# Patient Record
Sex: MALE | Race: OTHER | NOT HISPANIC OR LATINO | URBAN - METROPOLITAN AREA
[De-identification: names, ages, dates, MRNs, and addresses within clinical notes are randomized per-mention and may not be internally consistent; named-entity substitution may affect disease eponyms.]

---

## 2017-02-04 ENCOUNTER — EMERGENCY (EMERGENCY)
Facility: HOSPITAL | Age: 57
LOS: 1 days | Discharge: HOME | End: 2017-02-04
Attending: EMERGENCY MEDICINE | Admitting: EMERGENCY MEDICINE
Payer: MEDICAID

## 2017-02-04 VITALS
DIASTOLIC BLOOD PRESSURE: 88 MMHG | OXYGEN SATURATION: 100 % | TEMPERATURE: 98 F | SYSTOLIC BLOOD PRESSURE: 125 MMHG | HEART RATE: 95 BPM | RESPIRATION RATE: 17 BRPM

## 2017-02-04 VITALS
DIASTOLIC BLOOD PRESSURE: 89 MMHG | OXYGEN SATURATION: 100 % | TEMPERATURE: 98 F | HEART RATE: 76 BPM | RESPIRATION RATE: 19 BRPM | SYSTOLIC BLOOD PRESSURE: 158 MMHG

## 2017-02-04 LAB
ALBUMIN SERPL ELPH-MCNC: 4.1 G/DL — SIGNIFICANT CHANGE UP (ref 3.3–5)
ALP SERPL-CCNC: 68 U/L — SIGNIFICANT CHANGE UP (ref 40–120)
ALT FLD-CCNC: 20 U/L — SIGNIFICANT CHANGE UP (ref 4–41)
AST SERPL-CCNC: 25 U/L — SIGNIFICANT CHANGE UP (ref 4–40)
BASOPHILS # BLD AUTO: 0.04 K/UL — SIGNIFICANT CHANGE UP (ref 0–0.2)
BASOPHILS NFR BLD AUTO: 0.4 % — SIGNIFICANT CHANGE UP (ref 0–2)
BILIRUB SERPL-MCNC: 0.2 MG/DL — SIGNIFICANT CHANGE UP (ref 0.2–1.2)
BUN SERPL-MCNC: 9 MG/DL — SIGNIFICANT CHANGE UP (ref 7–23)
CALCIUM SERPL-MCNC: 8.6 MG/DL — SIGNIFICANT CHANGE UP (ref 8.4–10.5)
CHLORIDE SERPL-SCNC: 98 MMOL/L — SIGNIFICANT CHANGE UP (ref 98–107)
CK MB BLD-MCNC: 1.76 NG/ML — SIGNIFICANT CHANGE UP (ref 1–6.6)
CK MB BLD-MCNC: SIGNIFICANT CHANGE UP (ref 0–2.5)
CK SERPL-CCNC: 84 U/L — SIGNIFICANT CHANGE UP (ref 30–200)
CK SERPL-CCNC: 98 U/L — SIGNIFICANT CHANGE UP (ref 30–200)
CO2 SERPL-SCNC: 25 MMOL/L — SIGNIFICANT CHANGE UP (ref 22–31)
CREAT SERPL-MCNC: 0.66 MG/DL — SIGNIFICANT CHANGE UP (ref 0.5–1.3)
CRP SERPL-MCNC: 1.9 MG/L — SIGNIFICANT CHANGE UP (ref 0.3–5)
EOSINOPHIL # BLD AUTO: 0.09 K/UL — SIGNIFICANT CHANGE UP (ref 0–0.5)
EOSINOPHIL NFR BLD AUTO: 1 % — SIGNIFICANT CHANGE UP (ref 0–6)
ERYTHROCYTE [SEDIMENTATION RATE] IN BLOOD: 7 MM/HR — SIGNIFICANT CHANGE UP (ref 1–15)
GLUCOSE SERPL-MCNC: 118 MG/DL — HIGH (ref 70–99)
HCT VFR BLD CALC: 43.2 % — SIGNIFICANT CHANGE UP (ref 39–50)
HGB BLD-MCNC: 14.7 G/DL — SIGNIFICANT CHANGE UP (ref 13–17)
IMM GRANULOCYTES NFR BLD AUTO: 0.2 % — SIGNIFICANT CHANGE UP (ref 0–1.5)
LYMPHOCYTES # BLD AUTO: 3.76 K/UL — HIGH (ref 1–3.3)
LYMPHOCYTES # BLD AUTO: 41.4 % — SIGNIFICANT CHANGE UP (ref 13–44)
MCHC RBC-ENTMCNC: 28.5 PG — SIGNIFICANT CHANGE UP (ref 27–34)
MCHC RBC-ENTMCNC: 34 % — SIGNIFICANT CHANGE UP (ref 32–36)
MCV RBC AUTO: 83.7 FL — SIGNIFICANT CHANGE UP (ref 80–100)
MONOCYTES # BLD AUTO: 0.67 K/UL — SIGNIFICANT CHANGE UP (ref 0–0.9)
MONOCYTES NFR BLD AUTO: 7.4 % — SIGNIFICANT CHANGE UP (ref 2–14)
NEUTROPHILS # BLD AUTO: 4.51 K/UL — SIGNIFICANT CHANGE UP (ref 1.8–7.4)
NEUTROPHILS NFR BLD AUTO: 49.6 % — SIGNIFICANT CHANGE UP (ref 43–77)
PLATELET # BLD AUTO: 240 K/UL — SIGNIFICANT CHANGE UP (ref 150–400)
PMV BLD: 10.2 FL — SIGNIFICANT CHANGE UP (ref 7–13)
POTASSIUM SERPL-MCNC: 3.9 MMOL/L — SIGNIFICANT CHANGE UP (ref 3.5–5.3)
POTASSIUM SERPL-SCNC: 3.9 MMOL/L — SIGNIFICANT CHANGE UP (ref 3.5–5.3)
PROT SERPL-MCNC: 7.3 G/DL — SIGNIFICANT CHANGE UP (ref 6–8.3)
RBC # BLD: 5.16 M/UL — SIGNIFICANT CHANGE UP (ref 4.2–5.8)
RBC # FLD: 13.1 % — SIGNIFICANT CHANGE UP (ref 10.3–14.5)
SODIUM SERPL-SCNC: 138 MMOL/L — SIGNIFICANT CHANGE UP (ref 135–145)
TROPONIN T SERPL-MCNC: < 0.06 NG/ML — SIGNIFICANT CHANGE UP (ref 0–0.06)
TROPONIN T SERPL-MCNC: < 0.06 NG/ML — SIGNIFICANT CHANGE UP (ref 0–0.06)
WBC # BLD: 9.09 K/UL — SIGNIFICANT CHANGE UP (ref 3.8–10.5)
WBC # FLD AUTO: 9.09 K/UL — SIGNIFICANT CHANGE UP (ref 3.8–10.5)

## 2017-02-04 PROCEDURE — 93010 ELECTROCARDIOGRAM REPORT: CPT

## 2017-02-04 PROCEDURE — 99285 EMERGENCY DEPT VISIT HI MDM: CPT | Mod: 25

## 2017-02-04 PROCEDURE — 71020: CPT | Mod: 26

## 2017-02-04 RX ORDER — KETOROLAC TROMETHAMINE 30 MG/ML
15 SYRINGE (ML) INJECTION ONCE
Qty: 0 | Refills: 0 | Status: DISCONTINUED | OUTPATIENT
Start: 2017-02-04 | End: 2017-02-04

## 2017-02-04 RX ADMIN — Medication 15 MILLIGRAM(S): at 16:40

## 2017-02-04 RX ADMIN — Medication 15 MILLIGRAM(S): at 16:52

## 2017-02-04 NOTE — ED ADULT NURSE NOTE - OBJECTIVE STATEMENT
Pt a&ox3 primarily Sully speaking, pt refuses translation services. Pt c/o Rt sided facial pain, b/l shoulder pain (worst upon movement), since last night and intermittent cp to right side of his chest, reproducible. Pt breathing even and unlabored resp, denies headache/dizziness. Pt NSR on monitor. Abdomen soft, non-tender, non-distended. Skin is cool dry and intact. MD at [facility]. Will continue to monitor.

## 2017-02-04 NOTE — ED ADULT TRIAGE NOTE - CHIEF COMPLAINT QUOTE
Co right sided facial droop and numbness since 8:30pm last night. Hx of bells palsy. Co numbness to right side of facial, droop resolved. Equal strength noted in all extremities

## 2017-02-04 NOTE — ED PROVIDER NOTE - OBJECTIVE STATEMENT
I, ita alicea, am fluient in [facility]  56M pmh bell's palsy, htn, hld, gerd p/w R facial numbness acute on chronic. pt has had R sided facial numbness 4 times in life, last episode in november 2016 prior to that in 2013. this episode started yesterday. pt also has chronic intermitent b/l shoulder & trapezius pain sometime radiating to R pectoral region, worsened by exertion, relieved by advil. denies any current cp, focal areas of weakness, sob, f/c, nv/d, tick bites, recent travel, ha, visual changes, tinnitus. pt has had mri & ct head in [facility] that were unremarkable.

## 2017-02-04 NOTE — ED PROVIDER NOTE - ATTENDING CONTRIBUTION TO CARE
Locurto  pt with c/o upper back pain since last night also some assoc rt CP rt facial pain  Pain appears worse with upper body movement  denies SOB fever  weakness/numbness in arms or legs  no recent change in medication  Exam    pt in NAD  nl strength and sensation b/l,  CN nl, abd benign  lungs clear  no LE edema    pain appears worse with upper body movement carmen lifting arms over head  nl pulses in carotids  EKG  nl   check CXR electrolytes  CPK (on statin)   ERR/CRP (shoulder girdle pain)

## 2017-02-04 NOTE — ED PROVIDER NOTE - MEDICAL DECISION MAKING DETAILS
ddx: myalgia possibly 2/2 statins vs polymyalgia rheumatica vs muscle strain vs sprain. bell's palsy unlikely given no acute facial weakness. will r/o acs.   -ce x2 -cxr  -labs -esr -crp -reassess -consider dc home w/ rheum f/u

## 2022-01-16 ENCOUNTER — INPATIENT (INPATIENT)
Facility: HOSPITAL | Age: 62
LOS: 2 days | Discharge: HOME | End: 2022-01-19
Attending: STUDENT IN AN ORGANIZED HEALTH CARE EDUCATION/TRAINING PROGRAM | Admitting: STUDENT IN AN ORGANIZED HEALTH CARE EDUCATION/TRAINING PROGRAM
Payer: MEDICAID

## 2022-01-16 VITALS
RESPIRATION RATE: 16 BRPM | TEMPERATURE: 100 F | SYSTOLIC BLOOD PRESSURE: 155 MMHG | DIASTOLIC BLOOD PRESSURE: 83 MMHG | HEART RATE: 94 BPM | OXYGEN SATURATION: 98 %

## 2022-01-16 DIAGNOSIS — E78.5 HYPERLIPIDEMIA, UNSPECIFIED: ICD-10-CM

## 2022-01-16 DIAGNOSIS — R20.0 ANESTHESIA OF SKIN: ICD-10-CM

## 2022-01-16 DIAGNOSIS — K21.9 GASTRO-ESOPHAGEAL REFLUX DISEASE WITHOUT ESOPHAGITIS: ICD-10-CM

## 2022-01-16 DIAGNOSIS — I10 ESSENTIAL (PRIMARY) HYPERTENSION: ICD-10-CM

## 2022-01-16 DIAGNOSIS — R22.0 LOCALIZED SWELLING, MASS AND LUMP, HEAD: ICD-10-CM

## 2022-01-16 DIAGNOSIS — U07.1 COVID-19: ICD-10-CM

## 2022-01-16 DIAGNOSIS — E11.9 TYPE 2 DIABETES MELLITUS WITHOUT COMPLICATIONS: ICD-10-CM

## 2022-01-16 DIAGNOSIS — R59.0 LOCALIZED ENLARGED LYMPH NODES: ICD-10-CM

## 2022-01-16 DIAGNOSIS — R59.1 GENERALIZED ENLARGED LYMPH NODES: ICD-10-CM

## 2022-01-16 LAB
ALBUMIN SERPL ELPH-MCNC: 4.1 G/DL — SIGNIFICANT CHANGE UP (ref 3.3–5)
ALP SERPL-CCNC: 85 U/L — SIGNIFICANT CHANGE UP (ref 40–120)
ALT FLD-CCNC: 28 U/L — SIGNIFICANT CHANGE UP (ref 4–41)
ANION GAP SERPL CALC-SCNC: 13 MMOL/L — SIGNIFICANT CHANGE UP (ref 7–14)
ANION GAP SERPL CALC-SCNC: 9 MMOL/L — SIGNIFICANT CHANGE UP (ref 7–14)
APPEARANCE UR: CLEAR — SIGNIFICANT CHANGE UP
APTT BLD: 26.6 SEC — LOW (ref 27–36.3)
AST SERPL-CCNC: 19 U/L — SIGNIFICANT CHANGE UP (ref 4–40)
BASE EXCESS BLDV CALC-SCNC: 3.9 MMOL/L — HIGH (ref -2–3)
BASOPHILS # BLD AUTO: 0.04 K/UL — SIGNIFICANT CHANGE UP (ref 0–0.2)
BASOPHILS NFR BLD AUTO: 0.3 % — SIGNIFICANT CHANGE UP (ref 0–2)
BILIRUB SERPL-MCNC: 0.5 MG/DL — SIGNIFICANT CHANGE UP (ref 0.2–1.2)
BILIRUB UR-MCNC: NEGATIVE — SIGNIFICANT CHANGE UP
BLOOD GAS VENOUS COMPREHENSIVE RESULT: SIGNIFICANT CHANGE UP
BUN SERPL-MCNC: 6 MG/DL — LOW (ref 7–23)
BUN SERPL-MCNC: 6 MG/DL — LOW (ref 7–23)
C4 SERPL-MCNC: 40 MG/DL — SIGNIFICANT CHANGE UP (ref 10–40)
CA-I SERPL-SCNC: 1.1 MMOL/L — LOW (ref 1.15–1.33)
CALCIUM SERPL-MCNC: 8.2 MG/DL — LOW (ref 8.4–10.5)
CALCIUM SERPL-MCNC: 8.5 MG/DL — SIGNIFICANT CHANGE UP (ref 8.4–10.5)
CHLORIDE BLDV-SCNC: 99 MMOL/L — SIGNIFICANT CHANGE UP (ref 96–108)
CHLORIDE SERPL-SCNC: 88 MMOL/L — LOW (ref 98–107)
CHLORIDE SERPL-SCNC: 97 MMOL/L — LOW (ref 98–107)
CO2 BLDV-SCNC: 28.9 MMOL/L — HIGH (ref 22–26)
CO2 SERPL-SCNC: 28 MMOL/L — SIGNIFICANT CHANGE UP (ref 22–31)
CO2 SERPL-SCNC: 29 MMOL/L — SIGNIFICANT CHANGE UP (ref 22–31)
COLOR SPEC: COLORLESS — SIGNIFICANT CHANGE UP
CREAT SERPL-MCNC: 0.65 MG/DL — SIGNIFICANT CHANGE UP (ref 0.5–1.3)
CREAT SERPL-MCNC: 0.7 MG/DL — SIGNIFICANT CHANGE UP (ref 0.5–1.3)
CRP SERPL-MCNC: 9.3 MG/L — HIGH
D DIMER BLD IA.RAPID-MCNC: 770 NG/ML DDU — HIGH
DIFF PNL FLD: NEGATIVE — SIGNIFICANT CHANGE UP
EOSINOPHIL # BLD AUTO: 0.04 K/UL — SIGNIFICANT CHANGE UP (ref 0–0.5)
EOSINOPHIL NFR BLD AUTO: 0.3 % — SIGNIFICANT CHANGE UP (ref 0–6)
ERYTHROCYTE [SEDIMENTATION RATE] IN BLOOD: 9 MM/HR — SIGNIFICANT CHANGE UP (ref 1–15)
FERRITIN SERPL-MCNC: 112 NG/ML — SIGNIFICANT CHANGE UP (ref 30–400)
FIBRINOGEN PPP-MCNC: 322 MG/DL — SIGNIFICANT CHANGE UP (ref 290–520)
FLUAV AG NPH QL: SIGNIFICANT CHANGE UP
FLUBV AG NPH QL: SIGNIFICANT CHANGE UP
GAS PNL BLDV: 134 MMOL/L — LOW (ref 136–145)
GAS PNL BLDV: SIGNIFICANT CHANGE UP
GLUCOSE BLDV-MCNC: 211 MG/DL — HIGH (ref 70–99)
GLUCOSE SERPL-MCNC: 121 MG/DL — HIGH (ref 70–99)
GLUCOSE SERPL-MCNC: 228 MG/DL — HIGH (ref 70–99)
GLUCOSE UR QL: NEGATIVE — SIGNIFICANT CHANGE UP
HCO3 BLDV-SCNC: 28 MMOL/L — SIGNIFICANT CHANGE UP (ref 22–29)
HCT VFR BLD CALC: 43.4 % — SIGNIFICANT CHANGE UP (ref 39–50)
HCT VFR BLDA CALC: 43 % — SIGNIFICANT CHANGE UP (ref 39–51)
HGB BLD CALC-MCNC: 14.2 G/DL — SIGNIFICANT CHANGE UP (ref 13–17)
HGB BLD-MCNC: 14.9 G/DL — SIGNIFICANT CHANGE UP (ref 13–17)
IANC: 9.41 K/UL — HIGH (ref 1.5–8.5)
IMM GRANULOCYTES NFR BLD AUTO: 0.4 % — SIGNIFICANT CHANGE UP (ref 0–1.5)
INR BLD: 1.03 RATIO — SIGNIFICANT CHANGE UP (ref 0.88–1.16)
KETONES UR-MCNC: NEGATIVE — SIGNIFICANT CHANGE UP
LACTATE BLDV-MCNC: 2 MMOL/L — SIGNIFICANT CHANGE UP (ref 0.5–2)
LDH SERPL L TO P-CCNC: 373 U/L — HIGH (ref 135–225)
LEUKOCYTE ESTERASE UR-ACNC: NEGATIVE — SIGNIFICANT CHANGE UP
LYMPHOCYTES # BLD AUTO: 2.64 K/UL — SIGNIFICANT CHANGE UP (ref 1–3.3)
LYMPHOCYTES # BLD AUTO: 20.2 % — SIGNIFICANT CHANGE UP (ref 13–44)
MCHC RBC-ENTMCNC: 27.9 PG — SIGNIFICANT CHANGE UP (ref 27–34)
MCHC RBC-ENTMCNC: 34.3 GM/DL — SIGNIFICANT CHANGE UP (ref 32–36)
MCV RBC AUTO: 81.1 FL — SIGNIFICANT CHANGE UP (ref 80–100)
MONOCYTES # BLD AUTO: 0.9 K/UL — SIGNIFICANT CHANGE UP (ref 0–0.9)
MONOCYTES NFR BLD AUTO: 6.9 % — SIGNIFICANT CHANGE UP (ref 2–14)
NEUTROPHILS # BLD AUTO: 9.41 K/UL — HIGH (ref 1.8–7.4)
NEUTROPHILS NFR BLD AUTO: 71.9 % — SIGNIFICANT CHANGE UP (ref 43–77)
NITRITE UR-MCNC: NEGATIVE — SIGNIFICANT CHANGE UP
NRBC # BLD: 0 /100 WBCS — SIGNIFICANT CHANGE UP
NRBC # FLD: 0 K/UL — SIGNIFICANT CHANGE UP
PCO2 BLDV: 38 MMHG — LOW (ref 42–55)
PH BLDV: 7.47 — HIGH (ref 7.32–7.43)
PH UR: 7 — SIGNIFICANT CHANGE UP (ref 5–8)
PLATELET # BLD AUTO: 251 K/UL — SIGNIFICANT CHANGE UP (ref 150–400)
PO2 BLDV: 90 MMHG — SIGNIFICANT CHANGE UP
POTASSIUM BLDV-SCNC: 3.7 MMOL/L — SIGNIFICANT CHANGE UP (ref 3.5–5.1)
POTASSIUM SERPL-MCNC: 2.8 MMOL/L — CRITICAL LOW (ref 3.5–5.3)
POTASSIUM SERPL-MCNC: 3.8 MMOL/L — SIGNIFICANT CHANGE UP (ref 3.5–5.3)
POTASSIUM SERPL-SCNC: 2.8 MMOL/L — CRITICAL LOW (ref 3.5–5.3)
POTASSIUM SERPL-SCNC: 3.8 MMOL/L — SIGNIFICANT CHANGE UP (ref 3.5–5.3)
PROT SERPL-MCNC: 7.2 G/DL — SIGNIFICANT CHANGE UP (ref 6–8.3)
PROT UR-MCNC: NEGATIVE — SIGNIFICANT CHANGE UP
PROTHROM AB SERPL-ACNC: 11.7 SEC — SIGNIFICANT CHANGE UP (ref 10.6–13.6)
RBC # BLD: 5.35 M/UL — SIGNIFICANT CHANGE UP (ref 4.2–5.8)
RBC # FLD: 12.3 % — SIGNIFICANT CHANGE UP (ref 10.3–14.5)
RSV RNA NPH QL NAA+NON-PROBE: SIGNIFICANT CHANGE UP
SAO2 % BLDV: 97.2 % — SIGNIFICANT CHANGE UP
SARS-COV-2 RNA SPEC QL NAA+PROBE: DETECTED
SODIUM SERPL-SCNC: 129 MMOL/L — LOW (ref 135–145)
SODIUM SERPL-SCNC: 135 MMOL/L — SIGNIFICANT CHANGE UP (ref 135–145)
SP GR SPEC: 1 — SIGNIFICANT CHANGE UP (ref 1–1.05)
TROPONIN T, HIGH SENSITIVITY RESULT: 9 NG/L — SIGNIFICANT CHANGE UP
TROPONIN T, HIGH SENSITIVITY RESULT: 9 NG/L — SIGNIFICANT CHANGE UP
URATE SERPL-MCNC: 5.3 MG/DL — SIGNIFICANT CHANGE UP (ref 3.4–8.8)
UROBILINOGEN FLD QL: SIGNIFICANT CHANGE UP
WBC # BLD: 13.08 K/UL — HIGH (ref 3.8–10.5)
WBC # FLD AUTO: 13.08 K/UL — HIGH (ref 3.8–10.5)

## 2022-01-16 PROCEDURE — 70487 CT MAXILLOFACIAL W/DYE: CPT | Mod: 26,MA

## 2022-01-16 PROCEDURE — 93010 ELECTROCARDIOGRAM REPORT: CPT

## 2022-01-16 PROCEDURE — 99285 EMERGENCY DEPT VISIT HI MDM: CPT | Mod: 25

## 2022-01-16 PROCEDURE — 71045 X-RAY EXAM CHEST 1 VIEW: CPT | Mod: 26

## 2022-01-16 PROCEDURE — 70496 CT ANGIOGRAPHY HEAD: CPT | Mod: 26,MA

## 2022-01-16 PROCEDURE — 99223 1ST HOSP IP/OBS HIGH 75: CPT | Mod: GC

## 2022-01-16 PROCEDURE — 70498 CT ANGIOGRAPHY NECK: CPT | Mod: 26,MA

## 2022-01-16 RX ORDER — HYDROCHLOROTHIAZIDE 25 MG
25 TABLET ORAL DAILY
Refills: 0 | Status: DISCONTINUED | OUTPATIENT
Start: 2022-01-16 | End: 2022-01-19

## 2022-01-16 RX ORDER — INSULIN LISPRO 100/ML
1 VIAL (ML) SUBCUTANEOUS ONCE
Refills: 0 | Status: COMPLETED | OUTPATIENT
Start: 2022-01-16 | End: 2022-01-16

## 2022-01-16 RX ORDER — ENOXAPARIN SODIUM 100 MG/ML
40 INJECTION SUBCUTANEOUS DAILY
Refills: 0 | Status: DISCONTINUED | OUTPATIENT
Start: 2022-01-16 | End: 2022-01-16

## 2022-01-16 RX ORDER — ATORVASTATIN CALCIUM 80 MG/1
40 TABLET, FILM COATED ORAL AT BEDTIME
Refills: 0 | Status: DISCONTINUED | OUTPATIENT
Start: 2022-01-16 | End: 2022-01-19

## 2022-01-16 RX ORDER — METFORMIN HYDROCHLORIDE 850 MG/1
1 TABLET ORAL
Qty: 0 | Refills: 0 | DISCHARGE

## 2022-01-16 RX ORDER — DEXTROSE 50 % IN WATER 50 %
12.5 SYRINGE (ML) INTRAVENOUS ONCE
Refills: 0 | Status: DISCONTINUED | OUTPATIENT
Start: 2022-01-16 | End: 2022-01-19

## 2022-01-16 RX ORDER — AMLODIPINE BESYLATE 2.5 MG/1
10 TABLET ORAL DAILY
Refills: 0 | Status: DISCONTINUED | OUTPATIENT
Start: 2022-01-16 | End: 2022-01-19

## 2022-01-16 RX ORDER — INSULIN LISPRO 100/ML
VIAL (ML) SUBCUTANEOUS AT BEDTIME
Refills: 0 | Status: DISCONTINUED | OUTPATIENT
Start: 2022-01-16 | End: 2022-01-19

## 2022-01-16 RX ORDER — MAGNESIUM SULFATE 500 MG/ML
2 VIAL (ML) INJECTION ONCE
Refills: 0 | Status: COMPLETED | OUTPATIENT
Start: 2022-01-16 | End: 2022-01-16

## 2022-01-16 RX ORDER — ENOXAPARIN SODIUM 100 MG/ML
80 INJECTION SUBCUTANEOUS
Refills: 0 | Status: DISCONTINUED | OUTPATIENT
Start: 2022-01-16 | End: 2022-01-16

## 2022-01-16 RX ORDER — LANOLIN ALCOHOL/MO/W.PET/CERES
3 CREAM (GRAM) TOPICAL AT BEDTIME
Refills: 0 | Status: DISCONTINUED | OUTPATIENT
Start: 2022-01-16 | End: 2022-01-19

## 2022-01-16 RX ORDER — GLUCAGON INJECTION, SOLUTION 0.5 MG/.1ML
1 INJECTION, SOLUTION SUBCUTANEOUS ONCE
Refills: 0 | Status: DISCONTINUED | OUTPATIENT
Start: 2022-01-16 | End: 2022-01-19

## 2022-01-16 RX ORDER — POTASSIUM CHLORIDE 20 MEQ
10 PACKET (EA) ORAL
Refills: 0 | Status: COMPLETED | OUTPATIENT
Start: 2022-01-16 | End: 2022-01-16

## 2022-01-16 RX ORDER — SODIUM CHLORIDE 9 MG/ML
1000 INJECTION INTRAMUSCULAR; INTRAVENOUS; SUBCUTANEOUS
Refills: 0 | Status: DISCONTINUED | OUTPATIENT
Start: 2022-01-16 | End: 2022-01-16

## 2022-01-16 RX ORDER — ENOXAPARIN SODIUM 100 MG/ML
40 INJECTION SUBCUTANEOUS ONCE
Refills: 0 | Status: COMPLETED | OUTPATIENT
Start: 2022-01-16 | End: 2022-01-16

## 2022-01-16 RX ORDER — ASPIRIN/CALCIUM CARB/MAGNESIUM 324 MG
81 TABLET ORAL DAILY
Refills: 0 | Status: DISCONTINUED | OUTPATIENT
Start: 2022-01-16 | End: 2022-01-19

## 2022-01-16 RX ORDER — POTASSIUM CHLORIDE 20 MEQ
40 PACKET (EA) ORAL ONCE
Refills: 0 | Status: COMPLETED | OUTPATIENT
Start: 2022-01-16 | End: 2022-01-16

## 2022-01-16 RX ORDER — ENOXAPARIN SODIUM 100 MG/ML
80 INJECTION SUBCUTANEOUS
Refills: 0 | Status: DISCONTINUED | OUTPATIENT
Start: 2022-01-17 | End: 2022-01-19

## 2022-01-16 RX ORDER — DEXTROSE 50 % IN WATER 50 %
25 SYRINGE (ML) INTRAVENOUS ONCE
Refills: 0 | Status: DISCONTINUED | OUTPATIENT
Start: 2022-01-16 | End: 2022-01-19

## 2022-01-16 RX ORDER — ATORVASTATIN CALCIUM 80 MG/1
1 TABLET, FILM COATED ORAL
Qty: 0 | Refills: 0 | DISCHARGE

## 2022-01-16 RX ORDER — DEXAMETHASONE 0.5 MG/5ML
6 ELIXIR ORAL ONCE
Refills: 0 | Status: COMPLETED | OUTPATIENT
Start: 2022-01-16 | End: 2022-01-16

## 2022-01-16 RX ORDER — SODIUM CHLORIDE 9 MG/ML
1000 INJECTION INTRAMUSCULAR; INTRAVENOUS; SUBCUTANEOUS
Refills: 0 | Status: DISCONTINUED | OUTPATIENT
Start: 2022-01-16 | End: 2022-01-19

## 2022-01-16 RX ORDER — INSULIN LISPRO 100/ML
VIAL (ML) SUBCUTANEOUS
Refills: 0 | Status: DISCONTINUED | OUTPATIENT
Start: 2022-01-16 | End: 2022-01-19

## 2022-01-16 RX ORDER — DEXTROSE 50 % IN WATER 50 %
15 SYRINGE (ML) INTRAVENOUS ONCE
Refills: 0 | Status: DISCONTINUED | OUTPATIENT
Start: 2022-01-16 | End: 2022-01-19

## 2022-01-16 RX ORDER — FAMOTIDINE 10 MG/ML
20 INJECTION INTRAVENOUS ONCE
Refills: 0 | Status: COMPLETED | OUTPATIENT
Start: 2022-01-16 | End: 2022-01-16

## 2022-01-16 RX ORDER — SODIUM CHLORIDE 9 MG/ML
1000 INJECTION, SOLUTION INTRAVENOUS
Refills: 0 | Status: DISCONTINUED | OUTPATIENT
Start: 2022-01-16 | End: 2022-01-19

## 2022-01-16 RX ORDER — ACETAMINOPHEN 500 MG
975 TABLET ORAL ONCE
Refills: 0 | Status: COMPLETED | OUTPATIENT
Start: 2022-01-16 | End: 2022-01-16

## 2022-01-16 RX ORDER — FAMOTIDINE 10 MG/ML
0 INJECTION INTRAVENOUS
Qty: 0 | Refills: 0 | DISCHARGE

## 2022-01-16 RX ORDER — AMLODIPINE BESYLATE 2.5 MG/1
1 TABLET ORAL
Qty: 0 | Refills: 0 | DISCHARGE

## 2022-01-16 RX ORDER — DIPHENHYDRAMINE HCL 50 MG
25 CAPSULE ORAL ONCE
Refills: 0 | Status: COMPLETED | OUTPATIENT
Start: 2022-01-16 | End: 2022-01-16

## 2022-01-16 RX ORDER — SODIUM CHLORIDE 9 MG/ML
1000 INJECTION INTRAMUSCULAR; INTRAVENOUS; SUBCUTANEOUS ONCE
Refills: 0 | Status: COMPLETED | OUTPATIENT
Start: 2022-01-16 | End: 2022-01-16

## 2022-01-16 RX ORDER — FAMOTIDINE 10 MG/ML
20 INJECTION INTRAVENOUS DAILY
Refills: 0 | Status: DISCONTINUED | OUTPATIENT
Start: 2022-01-16 | End: 2022-01-19

## 2022-01-16 RX ORDER — SODIUM CHLORIDE 9 MG/ML
1000 INJECTION, SOLUTION INTRAVENOUS ONCE
Refills: 0 | Status: COMPLETED | OUTPATIENT
Start: 2022-01-16 | End: 2022-01-16

## 2022-01-16 RX ORDER — ACETAMINOPHEN 500 MG
650 TABLET ORAL EVERY 6 HOURS
Refills: 0 | Status: DISCONTINUED | OUTPATIENT
Start: 2022-01-16 | End: 2022-01-19

## 2022-01-16 RX ADMIN — ENOXAPARIN SODIUM 40 MILLIGRAM(S): 100 INJECTION SUBCUTANEOUS at 18:34

## 2022-01-16 RX ADMIN — Medication 25 MILLIGRAM(S): at 13:21

## 2022-01-16 RX ADMIN — Medication 1 UNIT(S): at 13:21

## 2022-01-16 RX ADMIN — Medication 975 MILLIGRAM(S): at 03:09

## 2022-01-16 RX ADMIN — Medication 100 MILLIEQUIVALENT(S): at 03:17

## 2022-01-16 RX ADMIN — SODIUM CHLORIDE 1000 MILLILITER(S): 9 INJECTION INTRAMUSCULAR; INTRAVENOUS; SUBCUTANEOUS at 02:13

## 2022-01-16 RX ADMIN — FAMOTIDINE 20 MILLIGRAM(S): 10 INJECTION INTRAVENOUS at 13:21

## 2022-01-16 RX ADMIN — Medication 25 MILLIGRAM(S): at 02:12

## 2022-01-16 RX ADMIN — Medication 2: at 17:45

## 2022-01-16 RX ADMIN — Medication 1 TABLET(S): at 15:31

## 2022-01-16 RX ADMIN — Medication 6 MILLIGRAM(S): at 02:13

## 2022-01-16 RX ADMIN — SODIUM CHLORIDE 1000 MILLILITER(S): 9 INJECTION, SOLUTION INTRAVENOUS at 07:01

## 2022-01-16 RX ADMIN — SODIUM CHLORIDE 125 MILLILITER(S): 9 INJECTION INTRAMUSCULAR; INTRAVENOUS; SUBCUTANEOUS at 03:17

## 2022-01-16 RX ADMIN — FAMOTIDINE 20 MILLIGRAM(S): 10 INJECTION INTRAVENOUS at 02:12

## 2022-01-16 RX ADMIN — Medication 81 MILLIGRAM(S): at 13:22

## 2022-01-16 RX ADMIN — SODIUM CHLORIDE 125 MILLILITER(S): 9 INJECTION INTRAMUSCULAR; INTRAVENOUS; SUBCUTANEOUS at 15:30

## 2022-01-16 RX ADMIN — ATORVASTATIN CALCIUM 40 MILLIGRAM(S): 80 TABLET, FILM COATED ORAL at 21:08

## 2022-01-16 RX ADMIN — ENOXAPARIN SODIUM 40 MILLIGRAM(S): 100 INJECTION SUBCUTANEOUS at 13:22

## 2022-01-16 RX ADMIN — Medication 100 MILLIEQUIVALENT(S): at 04:34

## 2022-01-16 RX ADMIN — Medication 25 GRAM(S): at 03:16

## 2022-01-16 RX ADMIN — Medication 40 MILLIEQUIVALENT(S): at 03:15

## 2022-01-16 RX ADMIN — AMLODIPINE BESYLATE 10 MILLIGRAM(S): 2.5 TABLET ORAL at 13:22

## 2022-01-16 RX ADMIN — Medication 100 MILLIEQUIVALENT(S): at 06:05

## 2022-01-16 NOTE — ED PROVIDER NOTE - ATTENDING CONTRIBUTION TO CARE
I performed a face-to-face evaluation of the patient and performed a history and physical examination along with the resident or ACP, and/or medical student above.  I agree with the history and physical examination as documented by the resident or ACP, and/or medical student above.  Calos:  60yo M w/ pmh as above p/w sudden onset swelling of lower lip 3 hours ago, reportedly progressed to upper face as well (though that swelling has since resolved) and associated w/ R sided face "numbness" including R forehead and cheek. Denies voice change/facial droop, motor deficits. No known allergies to foods and has not been started on new med. When asked if he takes ACEI such as lisinopril, pt initially said he believes he is on this medication but chart review did not show ACEI on medication list. Pt was seen in ED 4yrs ago for similar complaint (just R facial numbness at that time). Unlikely stroke and given that NIHSS is zero, tPA is not a consideration at this time. Of note, pt is febrile here but denies cough, subjective fever/chills, n/v. DDx includes angioedema vs CNS process vs paresthesias 2/2 to electrolyte imbalance vs allergic reaction. Labs, imaging, neuro consult, meds, TBA.

## 2022-01-16 NOTE — CONSULT NOTE ADULT - ATTENDING COMMENTS
Patient is seen and examed.    Overall he is feeling better now, slight numbness sensation over the right face, he said he had some swelling sensation over the right lips, but no significant swelling is appreciated on visual inspection.   Face is symmetric.  EOMI  Patient states the sensation over the right face is slightly different from left, but overall better this morning.     A/P  right facial numbness, r/o CVA, although low suspicion given today's exam.    Agree with stroke w/u, if MRI of brain is negative, may d/c aspirin and statin.   CT /CTA noted.   Lymphadenopathy:  medical w/u.   patient is also Covid +.

## 2022-01-16 NOTE — H&P ADULT - ASSESSMENT
LAUREN Virk speaking w/ hx bell's palsy (~8 years ago), HTN, HLD, GERD, veganism presented on 1/15 with  acute onset R facial edema and numbness likely in the setting of  M Sully speaking w/ hx bell's palsy (~8 years ago), HTN, HLD, GERD, veganism presented on 1/15 with  acute onset R facial edema and numbness likely in the setting of allergic reaction, TIA, vs infectious etiology.

## 2022-01-16 NOTE — H&P ADULT - NSHPPHYSICALEXAM_GEN_ALL_CORE
PHYSICAL EXAM:   GENERAL: Alert.  No acute distress.   HEAD:  Atraumatic. Normocephalic.  EYES: EOMI. PERRLA. Normal conjunctiva/sclera.  ENT: Neck supple. No JVD. Moist oral mucosa.    LYMPH: Palpable supraclavicular/cervical lymph nodes.   CARDIAC: Regular rate and rhythm. S1. S2. No murmur. No rub.    LUNG/CHEST: CTAB. BS equal bilaterally. No wheezes. No rales.    ABDOMEN: Soft. No tenderness. No distension. . Normal bowel sounds.  BACK: No midline/vertebral tenderness.    VASCULAR: +2 b/l radial or ulnar pulses.    EXTREMITIES:  No clubbing. No cyanosis. Moving all 4.  NEUROLOGY: A&Ox3. Non-focal exam. Cranial nerves intact. Normal speech. Sensation intact.  No appreciable facial swelling. Sensation to light touch in V1-V3 intact intact but slightly reduced on the right   PSYCH: Normal behavior. Normal affect.  SKIN: No jaundice. No erythema. No rash/lesion.  Vascular Access:     ICU Vital Signs Last 24 Hrs  T(C): 36.6 (16 Jan 2022 09:10), Max: 38.2 (16 Jan 2022 02:18)  T(F): 97.9 (16 Jan 2022 09:10), Max: 100.8 (16 Jan 2022 02:18)  HR: 79 (16 Jan 2022 09:10) (77 - 103)  BP: 141/82 (16 Jan 2022 09:10) (112/65 - 163/88)  RR: 18 (16 Jan 2022 09:10) (16 - 18)  SpO2: 100% (16 Jan 2022 09:10) (98% - 100%)      I&O's Summary

## 2022-01-16 NOTE — H&P ADULT - ATTENDING COMMENTS
Pt is a 55 yo M ramu speaking hx bells palsy in past, HTN p/w RT facial numbness and swelling. + pulm tamarind mix yesterday first time. s/p dexamethasone/ h2 blocker/ benadryl on arrival. .8 mild RT facial swelling mild; decreased sensation improved from admission. otherwise non focal. labs sig WBC 13 Na 129--135 s/p 2L IVF in ED. + COVID s/p vaccination. CT head/ neck without overt ICS/ECS no acute CVA noted oral max w/o overt mass or abscess. incidental finding of supraclavicular LAD and retropectoral LAD.    Rt lip numbess and tingling- Possibly associated with food exposure vs CVA/TIA( lower suspicion) vs dietary deficiency                                          -check B12                                           - MRI brain r/o CVA                                          - ASA/statin                                           - lipid panel                                          - check c1 esterase inhibitor   Lymphadenopathy- primarily less than 1 cm.  does note some weight loss but states he has been watching his sugar intake denies B symptoms. colonoscopy as per pt 3 yrs ago at [facility].                              - check Uric acid and LDH                             - check Ct chest/A/P                             - mildly elevated lactate cont IVF repeat lactate  COVID-19- CXR with no infiltrate; no oxygen requirement; s/p vaccine x 2 doses; no overt indication for remdesivir and dexamethasone               - check inflammatory markers   DVT prophylaxis - lovenox adjust accordingly after d-dimer.

## 2022-01-16 NOTE — H&P ADULT - PROBLEM SELECTOR PLAN 2
Generalized lymphadenopathy noted including left supraclavicular lymph nodes measuring up to 11 x 8 mm, right supraclavicular lymph nodes measuring 8 x 6 mm, left retropectoral lymph nodes up to 5 x 4mm.  In the setting of COVID-19 infection, favor infectious etiology. Also possible is autoimmune causes. Less likely malignancy/lymphproliferative disorders (denied B symptoms including weight loss, fever, drenching night sweats)   - Check OLVIN   - C1 esterase inhibitor

## 2022-01-16 NOTE — CONSULT NOTE ADULT - ASSESSMENT
56M w/ hx bell's palsy, HTN, HLD, GERD, vegan p/w acute onset R facial edema and numbness. LKN 8pm on 1/15/22. Acute symptom onset at 8pm when pt was just sitting. He reports more numbness and edema on the R lower>upper half of his face, including the R side of his lips. He denies any headache, facial pain, tingling, heaviness/droop, dysphagia, voice changes, mouth/tongue numbness. He denies any new foods or new medications. Symptoms have improved slightly. He reports similar symptoms 15 years ago that lasted 1 week and symptoms completely resolved. He reports more severe symptoms at that time, including R facial swelling, numbness, and heaviness. He went to [facility] and had a CTH/MRI and received sq injection medication. Pt also has chronic intermittent b/l shoulder & trapezius pain sometime radiating to R pectoral region, worsened by exertion, relieved by Advil. No prior hx of stroke. No smoking, alcohol use, illicity drug use. Neuro exam notable for decreased sensation to LT/PP on R V1-V3 but intact to temperature, decreased sensation to vibration and proprioception, diffuse hyporeflexia, no significant facial edema. Labs notable for K2.9, Na 129, WBC 13, COVID+. NIHSS: 1, preMRS: 0, no tpa or MT.    Impression: acute onset R facial edema and swelling likely 2/2 metabolic etiology (possibly nutritional deficiency given veganism) vs. stroke (pure sensory, L thalamic vs. brainstem) given acute onset and COVID+ vs. paresthesias from prior Bell's palsy vs. tumor vs. infectious etiology      Recommendations:  [] f/u CT max-face/CTA  [] check B12, folate, TSH, Mg, lyme, Utox  [x] Ca wnl  [] MRI brain w/ and w/o contrast  [] TTE   [] monitor on telemetry  [] start ASA 81 mg PO daily; can d/c if MRI negative for stroke  [] start atorvastatin 80mg PO daily (titrate to LDL < 70)   [] stroke risk factor modification and counseling   [] check HA1c, lipid panel  [] NPO until bedside dysphagia screen    Case to be seen and discussed with attending in AM

## 2022-01-16 NOTE — CONSULT NOTE ADULT - SUBJECTIVE AND OBJECTIVE BOX
HPI:  56M w/ hx bell's palsy, HTN, HLD, GERD p/w acute onset R facial numbness. Pt reports R sided facial numbness 4 times in life (11/2016, 2013). his episode started yesterday. Pt also has chronic intermittent b/l shoulder & trapezius pain sometime radiating to R pectoral region, worsened by exertion, relieved by Advil. Pt reports mri & ct head in [facility] that were unremarkable.    (Stroke only)  LKN:  NIHSS:   preMRS:   Pt is not a candidate for tpa due to [outside tpa window / mild, non-disabling deficit]  Pt is not a candidate for mechanical thrombectomy due to no large vessel occlusion on CTA    REVIEW OF SYSTEMS    A 10-system ROS was performed and is negative except for those items noted above and/or in the HPI.    PAST MEDICAL & SURGICAL HISTORY:  Essential hypertension    No significant past surgical history      FAMILY HISTORY:    SOCIAL HISTORY:   T/E/D:   Occupation:   Lives with:     MEDICATIONS (HOME):  Home Medications:    MEDICATIONS  (STANDING):  potassium chloride  10 mEq/100 mL IVPB 10 milliEquivalent(s) IV Intermittent every 1 hour  sodium chloride 0.9%. 1000 milliLiter(s) (125 mL/Hr) IV Continuous <Continuous>    MEDICATIONS  (PRN):    ALLERGIES/INTOLERANCES:  Allergies  Motrin (Rash)    Intolerances    VITALS & EXAMINATION:  Vital Signs Last 24 Hrs  T(C): 38.2 (16 Jan 2022 02:18), Max: 38.2 (16 Jan 2022 02:18)  T(F): 100.8 (16 Jan 2022 02:18), Max: 100.8 (16 Jan 2022 02:18)  HR: 86 (16 Jan 2022 02:18) (86 - 103)  BP: 112/65 (16 Jan 2022 02:18) (112/65 - 163/88)  BP(mean): --  RR: 18 (16 Jan 2022 02:18) (16 - 18)  SpO2: 100% (16 Jan 2022 02:18) (98% - 100%)    General:  Constitutional: Obese Male, appears stated age, in no apparent distress including pain  Head: Normocephalic & atraumatic.  Respiratory: No increased work of breathing  Extremities: No cyanosis, clubbing, or edema.  Skin: No rashes, bruising, or discoloration.    Neurological (>12):  MS: Awake, alert, oriented to person, place, situation, time. Normal affect. Follows all commands.    Language: Speech is clear, fluent with good repetition & comprehension (able to name objects___)    CNs: PERRL (R = 3mm, L = 3mm). VFF. EOMI no nystagmus, no diplopia. V1-3 intact to LT/pinprick, well developed masseter muscles b/l. No facial asymmetry b/l, full eye closure strength b/l. Hearing grossly normal (rubbing fingers) b/l. Symmetric palate elevation in midline. Gag reflex deferred. Head turning & shoulder shrug intact b/l. Tongue midline, normal movements, no atrophy.    Motor: Normal muscle bulk & tone. No noticeable tremor or seizure. No pronator drift.              Deltoid	Biceps	Triceps	Wrist	Finger ABd	   R	5	5	5	5	5		5 	  L	5	5	5	5	5		5    	H-Flex	H-Ext	H-ABd	H-ADd	K-Flex	K-Ext	D-Flex	P-Flex  R	5	5	5	5	5	5	5	5 	   L	5	5	5	5	5	5	5	5	     Sensation: Intact to LT/PP/Temp/Vibration/Position b/l throughout.     Cortical: Extinction on DSS (neglect): none    Reflexes:              Biceps(C5)       BR(C6)     Triceps(C7)               Patellar(L4)    Achilles(S1)    Plantar Resp  R	2	          2	             2		        2		    2		Down   L	2	          2	             2		        2		    2		Down     Coordination: intact rapid-alt movements. No dysmetria to FTN/HTS    Gait: Normal Romberg. No postural instability. Normal stance and tandem gait.     LABORATORY:  CBC                       14.9   13.08 )-----------( 251      ( 16 Jan 2022 02:17 )             43.4     Chem 01-16    129<L>  |  88<L>  |  6<L>  ----------------------------<  121<H>  2.8<LL>   |  28  |  0.65    Ca    8.5      16 Jan 2022 02:17    TPro  7.2  /  Alb  4.1  /  TBili  0.5  /  DBili  x   /  AST  19  /  ALT  28  /  AlkPhos  85  01-16    LFTs LIVER FUNCTIONS - ( 16 Jan 2022 02:17 )  Alb: 4.1 g/dL / Pro: 7.2 g/dL / ALK PHOS: 85 U/L / ALT: 28 U/L / AST: 19 U/L / GGT: x           Coagulopathy PT/INR - ( 16 Jan 2022 02:17 )   PT: 11.7 sec;   INR: 1.03 ratio         PTT - ( 16 Jan 2022 02:17 )  PTT:26.6 sec    STUDIES & IMAGING:  Studies (EKG, EEG, EMG, etc):     Radiology (XR, CT, MR, U/S, TTE/NIXON): HPI:  56M w/ hx bell's palsy, HTN, HLD, GERD, vegan p/w acute onset R facial edema and numbness. LKN 8pm on 1/15/22. Acute symptom onset at 8pm when pt was just sitting. He reports more numbness and edema on the R lower>upper half of his face, including the R side of his lips. He denies any headache, facial pain, tingling, heaviness/droop, dysphagia, voice changes, mouth/tongue numbness. He denies any new foods or new medications. Symptoms have improved slightly. He reports similar symptoms 15 years ago that lasted 1 week and symptoms completely resolved. He reports more severe symptoms at that time, including R facial swelling, numbness, and heaviness. He went to [facility] and had a CTH/MRI and received sq injection medication. Pt also has chronic intermittent b/l shoulder & trapezius pain sometime radiating to R pectoral region, worsened by exertion, relieved by Advil. No prior hx of stroke.     (Stroke only)  LKN: 8pm on 1/15/22.  NIHSS: 1  preMRS: 0  Pt is not a candidate for tpa due to outside tpa window  Pt is not a candidate for mechanical thrombectomy due to improving symptoms, low NIHSS    REVIEW OF SYSTEMS    A 10-system ROS was performed and is negative except for those items noted above and/or in the HPI.    PAST MEDICAL & SURGICAL HISTORY:  Essential hypertension    No significant past surgical history      FAMILY HISTORY:    SOCIAL HISTORY:   T/E/D:   Occupation:   Lives with:     MEDICATIONS (HOME):  Home Medications:    MEDICATIONS  (STANDING):  potassium chloride  10 mEq/100 mL IVPB 10 milliEquivalent(s) IV Intermittent every 1 hour  sodium chloride 0.9%. 1000 milliLiter(s) (125 mL/Hr) IV Continuous <Continuous>    MEDICATIONS  (PRN):    ALLERGIES/INTOLERANCES:  Allergies  Motrin (Rash)    Intolerances    VITALS & EXAMINATION:  Vital Signs Last 24 Hrs  T(C): 38.2 (16 Jan 2022 02:18), Max: 38.2 (16 Jan 2022 02:18)  T(F): 100.8 (16 Jan 2022 02:18), Max: 100.8 (16 Jan 2022 02:18)  HR: 86 (16 Jan 2022 02:18) (86 - 103)  BP: 112/65 (16 Jan 2022 02:18) (112/65 - 163/88)  BP(mean): --  RR: 18 (16 Jan 2022 02:18) (16 - 18)  SpO2: 100% (16 Jan 2022 02:18) (98% - 100%)    General:  Constitutional: Obese Male, appears stated age, in no apparent distress including pain  Head: Normocephalic & atraumatic. no significant facial edema.  Respiratory: No increased work of breathing  Extremities: No cyanosis, clubbing, or edema.  Skin: No rashes, bruising, or discoloration.    Neurological (>12):  MS: Awake, alert, oriented to person, place, situation, time. Normal affect. Follows all commands.    Language: Speech is clear, fluent with good repetition & comprehension (able to name objects thumb, mask)    CNs: PERRL (R = 3mm, L = 3mm). VFF. EOMI no nystagmus. V1-3 decreased to LT/pinprick on R but intact to temperature, well developed masseter muscles b/l. No facial asymmetry b/l, eyebrow raise symmetric. full eye closure strength b/l. Hearing grossly normal (rubbing fingers) b/l. Symmetric palate elevation in midline. Gag reflex deferred. Head turning & shoulder shrug intact b/l. Tongue midline, normal movements, no atrophy.    Motor: Normal muscle bulk & tone. No noticeable tremor or seizure. No pronator drift.              Deltoid	Biceps	Triceps	Wrist	Finger ABd	   R	5	5	5	5			5 	  L	5	5	5	5			5    	H-Flex	K-Flex	K-Ext	D-Flex	P-Flex  R	5	5	5	5	5	   L	5	5	5	5	5	     Sensation: Intact to LT/PP/Temp b/l throughout in trunk, arms, legs. Decreased sensation to Vibration up to b/l hands, Position up to ankles    Cortical: Extinction on DSS (neglect): none    Reflexes:              Biceps(C5)       BR(C6)     Triceps(C7)               Patellar(L4)    Achilles(S1)    Plantar Resp  R	1	          1	             1		        0		    1		mute  L	1	          1	             1		        0		    1		mute     Coordination:  No dysmetria to FTN    Gait: Normal Romberg. No postural instability. Normal stance and slightly wide based gait. Unable to perform tandem gait    LABORATORY:  CBC                       14.9   13.08 )-----------( 251      ( 16 Jan 2022 02:17 )             43.4     Chem 01-16    129<L>  |  88<L>  |  6<L>  ----------------------------<  121<H>  2.8<LL>   |  28  |  0.65    Ca    8.5      16 Jan 2022 02:17    TPro  7.2  /  Alb  4.1  /  TBili  0.5  /  DBili  x   /  AST  19  /  ALT  28  /  AlkPhos  85  01-16    LFTs LIVER FUNCTIONS - ( 16 Jan 2022 02:17 )  Alb: 4.1 g/dL / Pro: 7.2 g/dL / ALK PHOS: 85 U/L / ALT: 28 U/L / AST: 19 U/L / GGT: x           Coagulopathy PT/INR - ( 16 Jan 2022 02:17 )   PT: 11.7 sec;   INR: 1.03 ratio         PTT - ( 16 Jan 2022 02:17 )  PTT:26.6 sec    STUDIES & IMAGING:  Studies (EKG, EEG, EMG, etc):     Radiology (XR, CT, MR, U/S, TTE/NIXON):

## 2022-01-16 NOTE — H&P ADULT - HISTORY OF PRESENT ILLNESS
56M Sully speaking w/ hx bell's palsy (~8 years ago), HTN, HLD, GERD, veganism presented on 1/15 with  acute onset R facial edema and numbness. LKN 8pm on 1/15/22.     Acute symptom onset at 8pm when pt was just sitting. He reports more numbness and edema on the R lower>upper half of his face, including the R side of his lips. He denies any headache, facial pain, tingling, heaviness/droop, dysphagia, voice changes, mouth/tongue numbness. He denies any new foods or new medications. Symptoms have improved slightly. He reports similar symptoms 15 years ago that lasted 1 week and symptoms completely resolved. He reports more severe symptoms at that time, including R facial swelling, numbness, and heaviness. He went to [facility] and had a CTH/MRI and received sq injection medication. Pt also has chronic intermittent b/l shoulder & trapezius pain sometime radiating to R pectoral region, worsened by exertion, relieved by Advil. No prior hx of stroke.    56M Sully speaking w/ hx bell's palsy (~8 years ago), HTN, HLD, GERD, veganism presented on 1/15 with  acute onset R facial edema and numbness. LKN 8pm on 1/15/22.     Patient reported acute symptom onset at 8pm while sitting on the couch. He reports numbness and edema on the R lower>upper half of his face, including the R side of his lips. He denies any headache, facial pain, tingling, heaviness/droop, dysphagia, irregular speech, mouth/tongue numbness. No new medications. However, daughter reports that the only new food he had that evening was plum and tamarind mix.      He reports similar symptoms 15 years ago that lasted 1 week and symptoms completely resolved. He reports more severe symptoms at that time, including R facial swelling, numbness, and heaviness. He went to [facility] and had a CTH/MRI and received sq injection. No prior hx of stroke.     In the ED, patient noted to be febrile with Tmax 100.8, HR , //88, RR 17 on room air. He was found to be COVID positive (vaccinated x2 3/3 and 3/23). K was noted to be 2.8 and was repleted. Patient also received decadron 6mg, benadryl 25mg, famotidine 20mg, and 1L bolus. Neurology was consulted for stroke work up.     On my interview, patient notes improved swelling and only slight numb sensation over the right lips. 56M Sully speaking w/ hx bell's palsy (~8 years ago), HTN, HLD, GERD, veganism presented on 1/15 with  acute onset R facial edema and numbness. LKN 8pm on 1/15/22.     Patient reported acute symptom onset at 8pm while sitting on the couch. He reports numbness and edema on the R lower>upper half of his face, including the R side of his lips. He denies any fever, chills, headache, facial pain, tingling, heaviness/droop, dysphagia, irregular speech, mouth/tongue numbness. No new medications. However, daughter reports that the only new food he had that evening was plum and tamarind mix.      He reports similar symptoms 15 years ago that lasted 1 week and symptoms completely resolved. He reports more severe symptoms at that time, including R facial swelling, numbness, and heaviness. He went to [facility] and had a CTH/MRI and received sq injection. No prior hx of stroke.     In the ED, patient noted to be febrile with Tmax 100.8, HR , //88, RR 17 on room air. He was found to be COVID positive (vaccinated x2 3/3 and 3/23). K was noted to be 2.8 and was repleted. Patient also received decadron 6mg, benadryl 25mg, famotidine 20mg, and 1L bolus. Neurology was consulted for stroke work up.     On my interview, patient notes improved swelling and only slight numb sensation over the right lips.

## 2022-01-16 NOTE — H&P ADULT - PROBLEM SELECTOR PLAN 3
Incidental COVID-19 infection. Patient is COVID-19 vaccinated x2. Currently, minimal symptoms and on room air.   - Supportive care  - Monitor patient on telemetry   - Maintain SpO2 > 90%

## 2022-01-16 NOTE — H&P ADULT - NSHPREVIEWOFSYSTEMS_GEN_ALL_CORE
REVIEW OF SYSTEMS:  CONSTITUTIONAL: No weakness. No fevers. No chills. No rigors. No weight loss. No night sweats. No poor appetite.  EYES: No blurry vision. No double vision   ENT: No vertigo. No dysphagia. No sore throat. No Sinusitis/rhinorrhea.   NECK: No pain. No stiffness/rigidity.  CARDIAC: No chest pain. No palpitations. No lightheadedness. No syncope.  RESPIRATORY: No cough. No SOB. No hemoptysis.  GASTROINTESTINAL: No abdominal pain.  No nausea. No vomiting. No hematemesis. No diarrhea. No constipation.    GENITOURINARY: No dysuria. No frequency. No hesitancy.   NEUROLOGICAL: No numbness/tingling. No focal weakness. No urinary or fecal incontinence. No headache.   BACK: No back pain. No flank pain.  EXTREMITIES: No lower extremity edema. Full ROM.    SKIN: No rashes. No itching.    PSYCHIATRIC: No depression. No anxiety. No SI/HI.  ALLERGIC: + lip swelling.    All other review of systems is negative unless indicated above.  Unless indicated above, unable to assess ROS 2/2

## 2022-01-16 NOTE — ED ADULT NURSE NOTE - OBJECTIVE STATEMENT
A&oX4 ambulatory self care Sully speaking male presenting to the ed today from home for lip swelling, left facial numbness and itchiness. pt also c.o abdominal and chest pain . NSR on cardiac monitor. MD at bedside for eval. awaiting md alva A&oX4 ambulatory self care Sully speaking male presenting to the ed today from home for lip swelling, left facial numbness and itchiness. pt also c.o abdominal and chest pain . pt takes ACE inhibitor daily. airway patient, no drooling, no stirdor. NSR on cardiac monitor. MD at bedside for eval. awaiting md alva. A&oX4 ambulatory self care Sully speaking male presenting to the ed today from home for lip swelling, right facial numbness and itchiness. pt also c.o abdominal and chest pain . pt takes ACE inhibitor daily. airway patient, no drooling, no stirdor. NSR on cardiac monitor. MD at bedside for eval. awaiting md eval. 18g iv placed in right ac. butterflied for 2nd set of blood cultures in right hand A&oX4 ambulatory self care Sully ( [facility] - [id number]) speaking male presenting to the ed today from home for lip swelling, right facial numbness and itchiness. pt also c.o abdominal and chest pain . pt takes ACE inhibitor daily. airway patient, no drooling, no stirdor. NSR on cardiac monitor. MD at bedside for eval. awaiting md eval. 18g iv placed in right ac. butterflied for 2nd set of blood cultures in right hand never used

## 2022-01-16 NOTE — ED ADULT TRIAGE NOTE - CHIEF COMPLAINT QUOTE
Pt complaining of allergic reaction today since this afternoon, lips started to swelling and itching all over face, feels tight in chest and burning in stomach pmh dm elevated colhesterol htn gerd

## 2022-01-16 NOTE — ED PROVIDER NOTE - NS ED ROS FT
Review of Systems    Constitutional: (-) fever, (-) chills, (+) fatigue  HEENT: (+) sore throat, (-) hearing loss, (-) nasal congestion  Cardiovascular: (-) chest pain, (-) syncope  Respiratory: (-) cough, (-) shortness of breath  Gastrointestinal: (-) vomiting, (-) diarrhea, (-) abdominal pain  Musculoskeletal: (-) neck pain, (-) back pain, (-) joint pain  Integumentary: (-) rash, (-) edema, (-) wound  Neurological: (-) headache, (-) altered mental status    Except as documented in the HPI, all other systems are negative.

## 2022-01-16 NOTE — PATIENT PROFILE ADULT - FALL HARM RISK - HARM RISK INTERVENTIONS

## 2022-01-16 NOTE — ED PROVIDER NOTE - CLINICAL SUMMARY MEDICAL DECISION MAKING FREE TEXT BOX
Suzanne-PGY3: 61 year old male with PMH HTN, DM, GERD, HLD presents with lip swelling x 3 hours prior to arrival. Pt reports subjective right sided facial "heaviness" and numbness. Pt also reports occasional cough and epigastric discomfort. Denies any new food/medication related triggers. Pt reports similar episode in the past ~15 years ago, resolved without intervention. Denies ACEI use. Pt reports mild sore throat, but denies any fevers, chest pain, shortness of breath,  vomiting, diarrhea, bloody stools, black tarry stools, dysuria, headache, vision change, weakness, or rash. No objective swelling to lips, face, or tongue noted, when looking at himself in mirror pt states his lips appear swollen. Pt with diminished sensation over right face, strength intact, neuro exam otherwise nonfocal. Pt also noted to be febrile, unclear etiology. Will obtain labs, imaging, supportive treatment, neuro consult/recs with dispo pending workup.

## 2022-01-16 NOTE — DISCHARGE NOTE PROVIDER - HOSPITAL COURSE
HPI:  56M Sully speaking w/ hx bell's palsy (~8 years ago), HTN, HLD, GERD, veganism presented on 1/15 with  acute onset R facial edema and numbness. LKN 8pm on 1/15/22.     Patient reported acute symptom onset at 8pm while sitting on the couch. He reports numbness and edema on the R lower>upper half of his face, including the R side of his lips. He denies any fever, chills, headache, facial pain, tingling, heaviness/droop, dysphagia, irregular speech, mouth/tongue numbness. No new medications. However, daughter reports that the only new food he had that evening was plum and tamarind mix.      He reports similar symptoms 15 years ago that lasted 1 week and symptoms completely resolved. He reports more severe symptoms at that time, including R facial swelling, numbness, and heaviness. He went to [facility] and had a CTH/MRI and received sq injection. No prior hx of stroke.     In the ED, patient noted to be febrile with Tmax 100.8, HR , //88, RR 17 on room air. He was found to be COVID positive (vaccinated x2 3/3 and 3/23). K was noted to be 2.8 and was repleted. Patient also received decadron 6mg, benadryl 25mg, famotidine 20mg, and 1L bolus. Neurology was consulted for stroke work up.     On my interview, patient notes improved swelling and only slight numb sensation over the right lips. (16 Jan 2022 12:10)    HOSPITAL COURSE:     CT head/ neck without overt ICS/ECS and no acute CVA noted. CT max w/o overt mass or abscess. There was incidental finding of supraclavicular LAD and retropectoral LAD. Neurology was consulted. Low suspicion for CVA. MRI brain to r/o CVA showed ***** TTE showed *****     Patient with generalized lymphadenopathy less than 1cm and without overt B symptoms. CT C/A/P revealed     COVID-19 CXR with no infiltrate and patient did not require oxygen. Inflammatory markers were checked if revealed ****        HPI:  56M Sully speaking w/ hx bell's palsy (~8 years ago), HTN, HLD, GERD, veganism presented on 1/15 with  acute onset R facial edema and numbness. LKN 8pm on 1/15/22.     Patient reported acute symptom onset at 8pm while sitting on the couch. He reports numbness and edema on the R lower>upper half of his face, including the R side of his lips. He denies any fever, chills, headache, facial pain, tingling, heaviness/droop, dysphagia, irregular speech, mouth/tongue numbness. No new medications. However, daughter reports that the only new food he had that evening was plum and tamarind mix.      He reports similar symptoms 15 years ago that lasted 1 week and symptoms completely resolved. He reports more severe symptoms at that time, including R facial swelling, numbness, and heaviness. He went to [facility] and had a CTH/MRI and received sq injection. No prior hx of stroke.     In the ED, patient noted to be febrile with Tmax 100.8, HR , //88, RR 17 on room air. He was found to be COVID positive (vaccinated x2 3/3 and 3/23). K was noted to be 2.8 and was repleted. Patient also received decadron 6mg, benadryl 25mg, famotidine 20mg, and 1L bolus. Neurology was consulted for stroke work up.     On my interview, patient notes improved swelling and only slight numb sensation over the right lips. (16 Jan 2022 12:10)    HOSPITAL COURSE:     CT head/ neck without overt ICS/ECS and no acute CVA noted. CT max w/o overt mass or abscess. There was incidental finding of supraclavicular LAD and retropectoral LAD. Neurology was consulted. Low suspicion for CVA. MRI brain further confirmed no evidence of stroke. TTE showed normal EF with no wall motion abnormalities or clots.     Patient with generalized lymphadenopathy less than 1cm and without overt B symptoms. CT C/A/P done to rule out malignancy, which was negative.     COVID-19 CXR with no infiltrate and patient did not require oxygen. Inflammatory markers were checked if revealed elevated d-dimer >2ULN. Therefore, he was started on therapeutic lovenox. Recheck of d-dimers on 1/19 revealed significantly downtrended. His TTE was also normal without evidence of clots. Given his likelihood of clots is low, he will no longer require AC. Patient instructed to isolate per CDC guidelines for 5 days. Otherwise, he is medically stable for discharge.

## 2022-01-16 NOTE — ED PROVIDER NOTE - OBJECTIVE STATEMENT
Sully  #[id number]    61 year old male with PMH HTN, DM, GERD, HLD presents with lip swelling x 3 hours prior to arrival. Pt reports subjective right sided facial "heaviness" and numbness. Pt also reports occasional cough and epigastric discomfort. Denies any new food/medication related triggers. Pt reports similar episode in the past ~15 years ago, resolved without intervention. Denies ACEI use. Pt reports mild sore throat, but denies any fevers, chest pain, shortness of breath,  vomiting, diarrhea, bloody stools, black tarry stools, dysuria, headache, vision change, weakness, or rash. Denies any recent injury or trauma. Denies any additional complaints.

## 2022-01-16 NOTE — DISCHARGE NOTE PROVIDER - CARE PROVIDER_API CALL
Carlos Yanes  Internal Medicine  [address] [place] [address]  Phone: [phone]  Fax: [phone])-  Follow Up Time: 1 week   Carlos Yanes  Internal Medicine  [address] [place] [address]  Phone: ()-  Fax: ()-  Follow Up Time: 1 week    Jodie Webb)  Internal Medicine  [facility], [address] [address]  Phone: [phone]  Fax: [phone]  Follow Up Time: 1 week

## 2022-01-16 NOTE — H&P ADULT - NSHPLABSRESULTS_GEN_ALL_CORE
14.9   13.08 )-----------( 251      ( 2022 02:17 )             43.4            135  |  97<L>  |  6<L>  ----------------------------<  228<H>  3.8   |  29  |  0.70    Ca    8.2<L>      2022 08:13    TPro  7.2  /  Alb  4.1  /  TBili  0.5  /  DBili  x   /  AST  19  /  ALT  28  /  AlkPhos  85  01-16              Urinalysis Basic - ( 2022 05:15 )    Color: Colorless / Appearance: Clear / S.005 / pH: x  Gluc: x / Ketone: Negative  / Bili: Negative / Urobili: <2 mg/dL   Blood: x / Protein: Negative / Nitrite: Negative   Leuk Esterase: Negative / RBC: x / WBC x   Sq Epi: x / Non Sq Epi: x / Bacteria: x        PT/INR - ( 2022 02:17 )   PT: 11.7 sec;   INR: 1.03 ratio     PTT - ( 2022 02:17 )  PTT:26.6 sec         CAPILLARY BLOOD GLUCOSE      POCT Blood Glucose.: 117 mg/dL (2022 00:48) 14.9   13.08 )-----------( 251      ( 2022 02:17 )             43.4            135  |  97<L>  |  6<L>  ----------------------------<  228<H>  3.8   |  29  |  0.70    Ca    8.2<L>      2022 08:13    TPro  7.2  /  Alb  4.1  /  TBili  0.5  /  DBili  x   /  AST  19  /  ALT  28  /  AlkPhos  85  -              Urinalysis Basic - ( 2022 05:15 )    Color: Colorless / Appearance: Clear / S.005 / pH: x  Gluc: x / Ketone: Negative  / Bili: Negative / Urobili: <2 mg/dL   Blood: x / Protein: Negative / Nitrite: Negative   Leuk Esterase: Negative / RBC: x / WBC x   Sq Epi: x / Non Sq Epi: x / Bacteria: x        PT/INR - ( 2022 02:17 )   PT: 11.7 sec;   INR: 1.03 ratio     PTT - ( 2022 02:17 )  PTT:26.6 sec         CAPILLARY BLOOD GLUCOSE      POCT Blood Glucose.: 117 mg/dL (2022 00:48)    < from: CT Angio Neck w/ IV Cont (22 @ 06:00) >    IMPRESSION:  NONCONTRAST HEAD CT SCAN: No CT evidence of acute intracranial   hemorrhage, mass effect or acute territorial infarct.    CT ANGIOGRAPHY NECK:  1. the cervical carotid systems and vertebral arteries are patent without   evidence of stenosis or dissection.  2.  There are numerous left supraclavicular lymph nodes measuring up to   11 x 8 mm.  There are a few right supraclavicular lymph nodes measuring 8   x 6 mm.  The axilla are incompletely imaged.  Multiple right-sided   retropectoral lymph nodes measure up to 10 x 6 mm.  A few left   retropectoral lymph nodes measure up to 5 x 4 mm. An underlying   infectious/inflammatory process, lymphoproliferative disorder, or   malignancy is not excluded.  3.  Mildly enlarged adenoids.  4.  Ossification of the posterior longitudinal ligament causes diffuse   mild to moderate spinal canal stenosis at C3-C7.      CT ANGIOGRAPHY BRAIN: Novessel occlusion, flow-limiting stenosis or   aneurysm is identified about the [place] of [place].    CT MAXILLOFACIAL: No facial swelling lower lip swelling is appreciated by   CT technique.  No inflammatory change or abscess is visualized.    POSTCONTRAST HEAD CT SCAN: No CT evidence of or acute territorial   infarct, mass or abnormal enhancement.  Dural venous sinuses and   cavernous sinuses are patent.    < end of copied text >

## 2022-01-16 NOTE — ED ADULT NURSE NOTE - CHIEF COMPLAINT QUOTE
Quality 111:Pneumonia Vaccination Status For Older Adults: Pneumococcal Vaccination not Administered or Previously Received, Reason not Otherwise Specified Quality 431: Preventive Care And Screening: Unhealthy Alcohol Use - Screening: Patient screened for unhealthy alcohol use using a single question and scores less than 2 times per year Quality 130: Documentation Of Current Medications In The Medical Record: Current Medications Documented Quality 47: Advance Care Plan: Advance care planning not documented, reason not otherwise specified. Detail Level: Detailed Quality 110: Preventive Care And Screening: Influenza Immunization: Influenza Immunization not Administered because Patient Refused. Quality 226: Preventive Care And Screening: Tobacco Use: Screening And Cessation Intervention: Patient screened for tobacco use and is an ex/non-smoker Pt complaining of allergic reaction today since this afternoon, lips started to swelling and itching all over face, feels tight in chest and burning in stomach pmh dm elevated colhesterol htn gerd

## 2022-01-16 NOTE — H&P ADULT - PROBLEM SELECTOR PLAN 1
Acute onset R facial edema and swelling   -   - MRI rain w/ and w/p contrast  - Acute onset R facial edema and swelling. Low suspicion for CVA   - CT HEAD: acute intracranial hemorrhage, mass effect or acute territorial infarct  - CT ANGIOGRAPHY BRAIN: No vessel occlusion, flow-limiting stenosis or aneurysm is identified about the [place].  - CT MAXILLOFACIAL: No facial swelling lower lip swelling is appreciated.. No inflammatory change or abscess is visualized.  - CT NECK: no vessel stenosis or dissection. Noted generalized lymphadenopathy including left supraclavicular lymph nodes measuring up to 11 x 8 mm, right supraclavicular lymph nodes measuring 8 x 6 mm, left retropectoral lymph nodes up to 5 x 4mm.   Plan:   - MRI rain w/ and w/p contrast  - Check B12, folate, TSH, Mg, lyme, Utox  - TTE and continue telemetry monitoring  - Continue ASA 81mg and atorvastatin 40mg   - Obtain lipid panel, HbA1C in the AM

## 2022-01-16 NOTE — DISCHARGE NOTE PROVIDER - NSDCCPCAREPLAN_GEN_ALL_CORE_FT
PRINCIPAL DISCHARGE DIAGNOSIS  Diagnosis: Lip swelling  Assessment and Plan of Treatment:       SECONDARY DISCHARGE DIAGNOSES  Diagnosis: Generalized lymphadenopathy  Assessment and Plan of Treatment:     Diagnosis: COVID-19 virus infection  Assessment and Plan of Treatment:     Diagnosis: Hypokalemia  Assessment and Plan of Treatment:      PRINCIPAL DISCHARGE DIAGNOSIS  Diagnosis: Lip swelling  Assessment and Plan of Treatment:       SECONDARY DISCHARGE DIAGNOSES  Diagnosis: Generalized lymphadenopathy  Assessment and Plan of Treatment:     Diagnosis: COVID-19 virus infection  Assessment and Plan of Treatment:     Diagnosis: Vitamin B12 deficiency  Assessment and Plan of Treatment:      PRINCIPAL DISCHARGE DIAGNOSIS  Diagnosis: Lip swelling  Assessment and Plan of Treatment: You came to the hospital with numbness and swelling on the right lower half of the face as well as right lips. As your symptoms could be a stroke, we consulted neurology for further work up. Initial CT imaging did not show any signs of stroke or vessel narrowing. However, there was incidental finding of swollen lymph nodes. We monitored you in the hospital daily and your symptoms improved. We also did an MRI which was negative for stroke. Therefore, it is unclear what the reason is for your facial and lip swelling and numbness. However, we also found you have vitamin B12 deficiency which is likely due to your vegetarian diet. Patients with vitamin B12 deficiency can have neurologic symptoms as well. Therefore, we started you on vitamin B12 injections  daily.      SECONDARY DISCHARGE DIAGNOSES  Diagnosis: Generalized lymphadenopathy  Assessment and Plan of Treatment: As noted above, in your imaging, we found evidence of swollen lymph nodes. We suspect this is most likely due to your COVID-19 infection. However, sowllen lymph nodes can sometimes be a result of cancers so we did a CT chest/abdomen/pelvis which did not show any evidence of cancer. It did note mild fatty liver which can be multifactorial from diet to genetics. Please continue taking your cholesterol medicine daily.    Diagnosis: COVID-19 virus infection  Assessment and Plan of Treatment: You were found to have COVID-19 but due to your immunization status, you had very mild symptoms. You were monitored closely for your oxygen levels which remained within normal limits. However, your blood markers showed high d-dimers which has been associated with high propensity for clots. Therefore, we started you on a blood thinner during this hospital stay. We rechecked the level on 1/19 and noted that it has significantly gone down. Your heart ecocardiogram was normal function with no evidence of clots. Therefore, your likelihood of clots is low and you do not need to continue taknig this. Please quarantine per CDC guidelines for 5 days.    Diagnosis: Vitamin B12 deficiency  Assessment and Plan of Treatment: However, we also found you have vitamin B12 deficiency which is likely due to your vegetarian diet. Patients with vitamin B12 deficiency can have neurologic symptoms as well. Therefore, we started you on vitamin B12 injections  daily. Please follow up with yo PCP next week for vitamin B12 injections. In the meantime, please continue to take the vitamin B12 supplementation prescribed to you.     PRINCIPAL DISCHARGE DIAGNOSIS  Diagnosis: Lip swelling  Assessment and Plan of Treatment: You came to the hospital with numbness and swelling on the right lower half of the face as well as right lips. As your symptoms could be a stroke, we consulted neurology for further work up. Initial CT imaging did not show any signs of stroke or vessel narrowing. However, there was incidental finding of swollen lymph nodes. We monitored you in the hospital daily and your symptoms improved. We also did an MRI which was negative for stroke. Therefore, it is unclear what the reason is for your facial and lip swelling and numbness. However, we also found you have vitamin B12 deficiency which is likely due to your vegetarian diet. Patients with vitamin B12 deficiency can have neurologic symptoms as well. Therefore, we started you on vitamin B12 injections  daily. You do not need to continue taking aspirin given no stroke.      SECONDARY DISCHARGE DIAGNOSES  Diagnosis: Generalized lymphadenopathy  Assessment and Plan of Treatment: As noted above, in your imaging, we found evidence of swollen lymph nodes. We suspect this is most likely due to your COVID-19 infection. However, sowllen lymph nodes can sometimes be a result of cancers so we did a CT chest/abdomen/pelvis which did not show any evidence of cancer. It did note mild fatty liver which can be multifactorial from diet to genetics. Please continue taking your cholesterol medicine daily.    Diagnosis: COVID-19 virus infection  Assessment and Plan of Treatment: You were found to have COVID-19 but due to your immunization status, you had very mild symptoms. You were monitored closely for your oxygen levels which remained within normal limits. However, your blood markers showed high d-dimers which has been associated with high propensity for clots. Therefore, we started you on a blood thinner during this hospital stay. We rechecked the level on 1/19 and noted that it has significantly gone down. Your heart ecocardiogram was normal function with no evidence of clots. Therefore, your likelihood of clots is low and you do not need to continue taknig this. Please quarantine per CDC guidelines for 5 days.    Diagnosis: Vitamin B12 deficiency  Assessment and Plan of Treatment: However, we also found you have vitamin B12 deficiency which is likely due to your vegetarian diet. Patients with vitamin B12 deficiency can have neurologic symptoms as well. Therefore, we started you on vitamin B12 injections  daily. Please follow up with yo PCP next week for vitamin B12 injections. In the meantime, please continue to take the vitamin B12 supplementation prescribed to you.

## 2022-01-16 NOTE — ED PROVIDER NOTE - PHYSICAL EXAMINATION
CONSTITUTIONAL: non-toxic, well appearing  SKIN: no rash, no petechiae.  EYES: PERRL, EOMI, pink conjunctiva, anicteric  ENT: tongue and uvular midline, no exudates, moist mucous membranes. No objective lip or tongue swelling.   NECK: Supple; no meningismus, no JVD  CARD: RRR, no murmurs, equal radial pulses bilaterally 2+  RESP: CTAB, no respiratory distress  ABD: Soft, non-tender, non-distended, no peritoneal signs, no CVA tenderness  EXT: Normal ROM x4. No edema. No calf tenderness  NEURO: Alert, oriented. Neuro exam nonfocal, equal strength bilaterally. Diminished sensation over right face, right sided face intact. CN II-XII intact. Finger to nose intact. Steady gait.   PSYCH: Cooperative, appropriate.

## 2022-01-16 NOTE — ED PROVIDER NOTE - QRS
PHYSICAL THERAPY NOTE    Patient Name: Ira Hutton  LFWDC'K Date: 8/22/2021 08/22/21 0813   PT Last Visit   PT Visit Date 08/22/21   Note Type   Note Type Treatment   Pain Assessment   Pain Assessment Tool FLACC   Pain Rating: FLACC (Rest) - Face 0   Pain Rating: FLACC (Rest) - Legs 0   Pain Rating: FLACC (Rest) - Activity 0   Pain Rating: FLACC (Rest) - Cry 0   Pain Rating: FLACC (Rest) - Consolability 0   Score: FLACC (Rest) 0   Pain Rating: FLACC (Activity) - Face 1   Pain Rating: FLACC (Activity) - Legs 1   Pain Rating: FLACC (Activity) - Activity 1   Pain Rating: FLACC (Activity) - Cry 1   Pain Rating: FLACC (Activity) - Consolability 1   Score: FLACC (Activity) 5   Restrictions/Precautions   Weight Bearing Precautions Per Order No   Other Precautions Fall Risk;Pain   General   Family/Caregiver Present No   Subjective   Subjective Patient supine in bed and is agreeable to participate in therapy session  Pt identifers obtained from name & ID bracelet  Bed Mobility   Rolling R 3  Moderate assistance   Additional items Assist x 1;Bedrails; Increased time required;Verbal cues;LE management   Rolling L 2  Maximal assistance   Additional items Assist x 1;Bedrails; Increased time required;Verbal cues;LE management   Supine to Sit 2  Maximal assistance   Additional items Assist x 1;HOB elevated; Bedrails; Increased time required;Verbal cues;LE management   Sit to Supine 2  Maximal assistance   Additional items Assist x 2;Bedrails; Increased time required;Verbal cues;LE management   Additional Comments Pt supine in bed post session with bed alarm engaged, call bell and belongings in reach     Transfers   Sit to Stand Unable to assess  (c/o of nausea sitting EOB)   Additional Comments Pt tolerated sitting EOB x 12 minutes with mod ax1 progressing to min ax1 for sitting balance   Balance   Static Sitting Fair   Endurance Deficit Endurance Deficit Yes   Endurance Deficit Description limited activity tolerance   Activity Tolerance   Activity Tolerance Patient limited by fatigue;Patient limited by pain; Other (Comment)  (nausea)   Nurse Made Aware Spoke to [place], RN    Assessment   Prognosis Fair   Problem List Decreased strength;Decreased endurance; Impaired balance;Decreased mobility;Obesity;Pain   Assessment Patient in poor positioning sliding down and bed and is agreebale to participate and repositioning  Participated in bed mobility rolling to L and R side with increased difficulty to L side and increased assistance to hold positioning for hygenie  Supine to sit with max ax1 and increased time to complete transition to EOB using bed pad to assist  Pt tolerated sitting EOB x 12 minutes with mod ax1 progressing to min ax1 for sitting balance  STS trials not approrpiate as patient with compliants of nausea and requesting return to supine in bed  Requires max ax2 for return sit to supine  Continue to focus on OOB mobility with progression of bed mobility, sitting tolerance and transfers as appropriate and able  Goals   Patient Goals to be able to move my feet when I stand   STG Expiration Date 08/28/21   PT Treatment Day 1   Plan   Treatment/Interventions Functional transfer training;LE strengthening/ROM; Therapeutic exercise; Endurance training;Patient/family training;Equipment eval/education; Bed mobility;Spoke to nursing   PT Frequency Other (Comment)  (3-5x/week)   Recommendation   PT Discharge Recommendation Post acute rehabilitation services   Equipment Recommended Wheelchair  (pt has one)   Yuriy 8 in Bed Without Bedrails 1   Lying on Back to Sitting on Edge of Flat Bed 1   Moving Bed to Chair 1   Standing Up From Chair 1   Walk in Room 1   Climb 3-5 Stairs 1   Basic Mobility Inpatient Raw Score 6   Turning Head Towards Sound 3   Follow Simple Instructions 3   Low Function Basic Mobility Raw Score 12 Low Function Basic Mobility Standardized Score 18 33       The patient's AM-PAC Basic Mobility Inpatient Short Form Raw Score is 6, Standardized Score is 18 33    A standardized score less than 42 9 suggests the patient may benefit from discharge to post-acute rehabilitation services  Please also refer to the recommendation of the Physical Therapist for safe discharge planning          Lauren Coronel PTA 82

## 2022-01-16 NOTE — DISCHARGE NOTE PROVIDER - PROVIDER TOKENS
PROVIDER:[TOKEN:[[id number]],FOLLOWUP:[1 week]] PROVIDER:[TOKEN:[[id number]],FOLLOWUP:[1 week]],PROVIDER:[TOKEN:[[id number]],FOLLOWUP:[1 week]]

## 2022-01-16 NOTE — DISCHARGE NOTE PROVIDER - CARE PROVIDERS DIRECT ADDRESSES
[email or web address]

## 2022-01-16 NOTE — H&P ADULT - NSICDXPASTMEDICALHX_GEN_ALL_CORE_FT
PAST MEDICAL HISTORY:  Essential hypertension     GERD (gastroesophageal reflux disease)     Hyperlipidemia     Type 2 diabetes mellitus

## 2022-01-16 NOTE — DISCHARGE NOTE PROVIDER - NSDCMRMEDTOKEN_GEN_ALL_CORE_FT
amLODIPine 10 mg oral tablet: 1 tab(s) orally once a day  aspirin 81 mg oral tablet, chewable: 1 tab(s) orally once a day  atorvastatin 40 mg oral tablet: 1 tab(s) orally once a day  hydroCHLOROthiazide 25 mg oral tablet: 1 tab(s) orally once a day  metFORMIN 500 mg oral tablet: 1 tab(s) orally once a day  Pepcid 20 mg oral tablet: orally once a day   amLODIPine 10 mg oral tablet: 1 tab(s) orally once a day  atorvastatin 40 mg oral tablet: 1 tab(s) orally once a day  hydroCHLOROthiazide 25 mg oral tablet: 1 tab(s) orally once a day  metFORMIN 500 mg oral tablet: 1 tab(s) orally once a day  Pepcid 20 mg oral tablet: orally once a day   amLODIPine 10 mg oral tablet: 1 tab(s) orally once a day  atorvastatin 40 mg oral tablet: 1 tab(s) orally once a day  cyanocobalamin 1000 mcg oral tablet: 1 tab(s) orally once a day   hydroCHLOROthiazide 25 mg oral tablet: 1 tab(s) orally once a day  metFORMIN 500 mg oral tablet: 1 tab(s) orally once a day  Pepcid 20 mg oral tablet: orally once a day

## 2022-01-16 NOTE — ED PROVIDER NOTE - PROGRESS NOTE DETAILS
Suzanne-PGY3: spoke to neurology re: consult, pending recs. Suzanne-PGY3: pt seen by neurology, recommending MRI and additional w/u r/o metabolic etiology. Pt noted to be hypokalemic to 2.8 and hyponatremic to 129. No EKG changes. Potassium repletion ordered, anticipate likely medicine admission for further evaluation/management pending CT results.

## 2022-01-17 LAB
A1C WITH ESTIMATED AVERAGE GLUCOSE RESULT: 7.4 % — HIGH (ref 4–5.6)
ALBUMIN SERPL ELPH-MCNC: 3.9 G/DL — SIGNIFICANT CHANGE UP (ref 3.3–5)
ALP SERPL-CCNC: 79 U/L — SIGNIFICANT CHANGE UP (ref 40–120)
ALT FLD-CCNC: 27 U/L — SIGNIFICANT CHANGE UP (ref 4–41)
ANION GAP SERPL CALC-SCNC: 10 MMOL/L — SIGNIFICANT CHANGE UP (ref 7–14)
ANION GAP SERPL CALC-SCNC: 11 MMOL/L — SIGNIFICANT CHANGE UP (ref 7–14)
AST SERPL-CCNC: 18 U/L — SIGNIFICANT CHANGE UP (ref 4–40)
B BURGDOR C6 AB SER-ACNC: NEGATIVE — SIGNIFICANT CHANGE UP
B BURGDOR IGG+IGM SER-ACNC: 0.06 INDEX — SIGNIFICANT CHANGE UP (ref 0.01–0.89)
BASOPHILS # BLD AUTO: 0.02 K/UL — SIGNIFICANT CHANGE UP (ref 0–0.2)
BASOPHILS NFR BLD AUTO: 0.2 % — SIGNIFICANT CHANGE UP (ref 0–2)
BILIRUB SERPL-MCNC: 0.4 MG/DL — SIGNIFICANT CHANGE UP (ref 0.2–1.2)
BUN SERPL-MCNC: 7 MG/DL — SIGNIFICANT CHANGE UP (ref 7–23)
BUN SERPL-MCNC: 7 MG/DL — SIGNIFICANT CHANGE UP (ref 7–23)
CALCIUM SERPL-MCNC: 8.6 MG/DL — SIGNIFICANT CHANGE UP (ref 8.4–10.5)
CALCIUM SERPL-MCNC: 8.8 MG/DL — SIGNIFICANT CHANGE UP (ref 8.4–10.5)
CHLORIDE SERPL-SCNC: 100 MMOL/L — SIGNIFICANT CHANGE UP (ref 98–107)
CHLORIDE SERPL-SCNC: 97 MMOL/L — LOW (ref 98–107)
CHOLEST SERPL-MCNC: 128 MG/DL — SIGNIFICANT CHANGE UP
CO2 SERPL-SCNC: 27 MMOL/L — SIGNIFICANT CHANGE UP (ref 22–31)
CO2 SERPL-SCNC: 29 MMOL/L — SIGNIFICANT CHANGE UP (ref 22–31)
CREAT SERPL-MCNC: 0.59 MG/DL — SIGNIFICANT CHANGE UP (ref 0.5–1.3)
CREAT SERPL-MCNC: 0.59 MG/DL — SIGNIFICANT CHANGE UP (ref 0.5–1.3)
CULTURE RESULTS: SIGNIFICANT CHANGE UP
EOSINOPHIL # BLD AUTO: 0.01 K/UL — SIGNIFICANT CHANGE UP (ref 0–0.5)
EOSINOPHIL NFR BLD AUTO: 0.1 % — SIGNIFICANT CHANGE UP (ref 0–6)
ESTIMATED AVERAGE GLUCOSE: 166 — SIGNIFICANT CHANGE UP
FOLATE SERPL-MCNC: 14.6 NG/ML — SIGNIFICANT CHANGE UP (ref 3.1–17.5)
GLUCOSE SERPL-MCNC: 143 MG/DL — HIGH (ref 70–99)
GLUCOSE SERPL-MCNC: 194 MG/DL — HIGH (ref 70–99)
HCT VFR BLD CALC: 41 % — SIGNIFICANT CHANGE UP (ref 39–50)
HCV AB S/CO SERPL IA: 0.14 S/CO — SIGNIFICANT CHANGE UP (ref 0–0.99)
HCV AB SERPL-IMP: SIGNIFICANT CHANGE UP
HDLC SERPL-MCNC: 36 MG/DL — LOW
HGB BLD-MCNC: 13.8 G/DL — SIGNIFICANT CHANGE UP (ref 13–17)
IANC: 4.44 K/UL — SIGNIFICANT CHANGE UP (ref 1.5–8.5)
IMM GRANULOCYTES NFR BLD AUTO: 0.4 % — SIGNIFICANT CHANGE UP (ref 0–1.5)
LIPID PNL WITH DIRECT LDL SERPL: 73 MG/DL — SIGNIFICANT CHANGE UP
LYMPHOCYTES # BLD AUTO: 3.04 K/UL — SIGNIFICANT CHANGE UP (ref 1–3.3)
LYMPHOCYTES # BLD AUTO: 36.9 % — SIGNIFICANT CHANGE UP (ref 13–44)
MAGNESIUM SERPL-MCNC: 2.1 MG/DL — SIGNIFICANT CHANGE UP (ref 1.6–2.6)
MAGNESIUM SERPL-MCNC: 2.1 MG/DL — SIGNIFICANT CHANGE UP (ref 1.6–2.6)
MCHC RBC-ENTMCNC: 28.2 PG — SIGNIFICANT CHANGE UP (ref 27–34)
MCHC RBC-ENTMCNC: 33.7 GM/DL — SIGNIFICANT CHANGE UP (ref 32–36)
MCV RBC AUTO: 83.7 FL — SIGNIFICANT CHANGE UP (ref 80–100)
MONOCYTES # BLD AUTO: 0.7 K/UL — SIGNIFICANT CHANGE UP (ref 0–0.9)
MONOCYTES NFR BLD AUTO: 8.5 % — SIGNIFICANT CHANGE UP (ref 2–14)
NEUTROPHILS # BLD AUTO: 4.44 K/UL — SIGNIFICANT CHANGE UP (ref 1.8–7.4)
NEUTROPHILS NFR BLD AUTO: 53.9 % — SIGNIFICANT CHANGE UP (ref 43–77)
NON HDL CHOLESTEROL: 92 MG/DL — SIGNIFICANT CHANGE UP
NRBC # BLD: 0 /100 WBCS — SIGNIFICANT CHANGE UP
NRBC # FLD: 0 K/UL — SIGNIFICANT CHANGE UP
PHOSPHATE SERPL-MCNC: 3 MG/DL — SIGNIFICANT CHANGE UP (ref 2.5–4.5)
PHOSPHATE SERPL-MCNC: 3 MG/DL — SIGNIFICANT CHANGE UP (ref 2.5–4.5)
PLATELET # BLD AUTO: 266 K/UL — SIGNIFICANT CHANGE UP (ref 150–400)
POTASSIUM SERPL-MCNC: 2.9 MMOL/L — CRITICAL LOW (ref 3.5–5.3)
POTASSIUM SERPL-MCNC: 3.2 MMOL/L — LOW (ref 3.5–5.3)
POTASSIUM SERPL-SCNC: 2.9 MMOL/L — CRITICAL LOW (ref 3.5–5.3)
POTASSIUM SERPL-SCNC: 3.2 MMOL/L — LOW (ref 3.5–5.3)
PROT SERPL-MCNC: 6.7 G/DL — SIGNIFICANT CHANGE UP (ref 6–8.3)
RBC # BLD: 4.9 M/UL — SIGNIFICANT CHANGE UP (ref 4.2–5.8)
RBC # FLD: 12.7 % — SIGNIFICANT CHANGE UP (ref 10.3–14.5)
SODIUM SERPL-SCNC: 136 MMOL/L — SIGNIFICANT CHANGE UP (ref 135–145)
SODIUM SERPL-SCNC: 138 MMOL/L — SIGNIFICANT CHANGE UP (ref 135–145)
SPECIMEN SOURCE: SIGNIFICANT CHANGE UP
TRIGL SERPL-MCNC: 95 MG/DL — SIGNIFICANT CHANGE UP
TSH SERPL-MCNC: 0.8 UIU/ML — SIGNIFICANT CHANGE UP (ref 0.27–4.2)
VIT B12 SERPL-MCNC: 177 PG/ML — LOW (ref 200–900)
WBC # BLD: 8.24 K/UL — SIGNIFICANT CHANGE UP (ref 3.8–10.5)
WBC # FLD AUTO: 8.24 K/UL — SIGNIFICANT CHANGE UP (ref 3.8–10.5)

## 2022-01-17 PROCEDURE — 99233 SBSQ HOSP IP/OBS HIGH 50: CPT | Mod: GC

## 2022-01-17 PROCEDURE — 74177 CT ABD & PELVIS W/CONTRAST: CPT | Mod: 26

## 2022-01-17 PROCEDURE — 71260 CT THORAX DX C+: CPT | Mod: 26

## 2022-01-17 RX ORDER — CHLORHEXIDINE GLUCONATE 213 G/1000ML
1 SOLUTION TOPICAL DAILY
Refills: 0 | Status: DISCONTINUED | OUTPATIENT
Start: 2022-01-17 | End: 2022-01-19

## 2022-01-17 RX ORDER — POTASSIUM CHLORIDE 20 MEQ
10 PACKET (EA) ORAL
Refills: 0 | Status: COMPLETED | OUTPATIENT
Start: 2022-01-17 | End: 2022-01-17

## 2022-01-17 RX ORDER — POTASSIUM CHLORIDE 20 MEQ
20 PACKET (EA) ORAL ONCE
Refills: 0 | Status: DISCONTINUED | OUTPATIENT
Start: 2022-01-17 | End: 2022-01-17

## 2022-01-17 RX ORDER — PREGABALIN 225 MG/1
1000 CAPSULE ORAL DAILY
Refills: 0 | Status: DISCONTINUED | OUTPATIENT
Start: 2022-01-17 | End: 2022-01-19

## 2022-01-17 RX ORDER — POTASSIUM CHLORIDE 20 MEQ
40 PACKET (EA) ORAL ONCE
Refills: 0 | Status: COMPLETED | OUTPATIENT
Start: 2022-01-17 | End: 2022-01-17

## 2022-01-17 RX ADMIN — CHLORHEXIDINE GLUCONATE 1 APPLICATION(S): 213 SOLUTION TOPICAL at 15:02

## 2022-01-17 RX ADMIN — ENOXAPARIN SODIUM 80 MILLIGRAM(S): 100 INJECTION SUBCUTANEOUS at 05:09

## 2022-01-17 RX ADMIN — Medication 100 MILLIEQUIVALENT(S): at 12:58

## 2022-01-17 RX ADMIN — AMLODIPINE BESYLATE 10 MILLIGRAM(S): 2.5 TABLET ORAL at 05:09

## 2022-01-17 RX ADMIN — Medication 100 MILLIEQUIVALENT(S): at 11:19

## 2022-01-17 RX ADMIN — Medication 40 MILLIEQUIVALENT(S): at 10:23

## 2022-01-17 RX ADMIN — PREGABALIN 1000 MICROGRAM(S): 225 CAPSULE ORAL at 12:59

## 2022-01-17 RX ADMIN — ATORVASTATIN CALCIUM 40 MILLIGRAM(S): 80 TABLET, FILM COATED ORAL at 21:24

## 2022-01-17 RX ADMIN — Medication 1 TABLET(S): at 09:49

## 2022-01-17 RX ADMIN — ENOXAPARIN SODIUM 80 MILLIGRAM(S): 100 INJECTION SUBCUTANEOUS at 17:23

## 2022-01-17 RX ADMIN — FAMOTIDINE 20 MILLIGRAM(S): 10 INJECTION INTRAVENOUS at 09:49

## 2022-01-17 RX ADMIN — Medication 81 MILLIGRAM(S): at 09:49

## 2022-01-17 RX ADMIN — Medication 25 MILLIGRAM(S): at 05:09

## 2022-01-17 RX ADMIN — Medication 1: at 12:46

## 2022-01-17 RX ADMIN — Medication 100 MILLIEQUIVALENT(S): at 10:23

## 2022-01-17 NOTE — PROGRESS NOTE ADULT - PROBLEM SELECTOR PLAN 3
Incidental COVID-19 infection. Patient is COVID-19 vaccinated x2. Currently, minimal symptoms and on room air.   - Supportive care  - Monitor patient on telemetry   - Maintain SpO2 > 90% Incidental COVID-19 infection. Patient is COVID-19 vaccinated x2. Currently, minimal symptoms and on room air.   - Supportive care  - Inflammatory markers notable for >2x ULN for d-dimers. Therefore, will start therapeutic lovenox 80mg BID   - Monitor patient on telemetry   - Maintain SpO2 > 90%

## 2022-01-17 NOTE — PROGRESS NOTE ADULT - PROBLEM SELECTOR PLAN 2
Generalized lymphadenopathy noted including left supraclavicular lymph nodes measuring up to 11 x 8 mm, right supraclavicular lymph nodes measuring 8 x 6 mm, left retropectoral lymph nodes up to 5 x 4mm.  In the setting of COVID-19 infection, favor infectious etiology. Also possible is autoimmune causes. Less likely malignancy/lymphproliferative disorders (denied B symptoms including weight loss, fever, drenching night sweats)   - Check OLVIN   - C1 esterase inhibitor Generalized lymphadenopathy noted including left supraclavicular lymph nodes measuring up to 11 x 8 mm, right supraclavicular lymph nodes measuring 8 x 6 mm, left retropectoral lymph nodes up to 5 x 4mm.  In the setting of COVID-19 infection, favor infectious etiology. Also possible is autoimmune causes. Less likely malignancy/lymphproliferative disorders (denied B symptoms including weight loss, fever, drenching night sweats)   - Check OLVIN   - C1 esterase inhibitor  - Consult IR for biopsy?

## 2022-01-17 NOTE — PROGRESS NOTE ADULT - ATTENDING COMMENTS
Patient seen and examined with team  Agree with above A/p  by Dr Coreas  61  Sully speaking w/ hx bell's palsy (~8 years ago), HTN, HLD, GERD, veganism presented on 1/15 with  acute onset R facial edema and numbness likely in the setting of allergic reaction, TIA, vs infectious etiology.  Ct of head- no CVA/ hemorrhage. Neurology following. Ct shows Lynphadenopathy  PE NAD  rad< from: CT Angio Neck w/ IV Cont (01.16.22 @ 06:00) >  2.  There are numerous left supraclavicular lymph nodes measuring up to   11 x 8 mm.  There are a few right supraclavicular lymph nodes measuring 8   x 6 mm.  The axilla are incompletely imaged.  Multiple right-sided   retropectoral lymph nodes measure up to 10 x 6 mm.  A few left   retropectoral lymph nodes measure up to 5 x 4 mm. An underlying   infectious/inflammatory process, lymphoproliferative disorder, or   malignancy is not excluded.    < from: CT Angio Head w/ IV Cont (01.16.22 @ 05:59) >  IMPRESSION:  NONCONTRAST HEAD CT SCAN: No CT evidence of acute intracranial hemorrhage, mass effect or acute territorial infarct.    A/P  # Neuro- r/o cva- MRI of brain ord. Neurology appreciated  # Onc- ? malignancy- Ct of c/a/p . Ir eval for Bxp of node  # Elevated d dimmer- agree Lovenox bid in this patient with incidental finding of Covid19 positive w/o hypoxia.- will need to hold for Ir Bxp Patient seen and examined with team  Agree with above A/p  by Dr Coreas  61  Sully speaking w/ hx bell's palsy (~8 years ago), HTN, HLD, GERD, veganism presented on 1/15 with  acute onset R facial edema and numbness likely in the setting of allergic reaction, TIA, vs infectious etiology.  Ct of head- no CVA/ hemorrhage. Neurology following. Ct shows Lynphadenopathy  PE NAD Updated daughter on telephome at bedside  rad< from: CT Angio Neck w/ IV Cont (01.16.22 @ 06:00) >  2.  There are numerous left supraclavicular lymph nodes measuring up to   11 x 8 mm.  There are a few right supraclavicular lymph nodes measuring 8   x 6 mm.  The axilla are incompletely imaged.  Multiple right-sided   retropectoral lymph nodes measure up to 10 x 6 mm.  A few left   retropectoral lymph nodes measure up to 5 x 4 mm. An underlying   infectious/inflammatory process, lymphoproliferative disorder, or   malignancy is not excluded.    < from: CT Angio Head w/ IV Cont (01.16.22 @ 05:59) >  IMPRESSION:  NONCONTRAST HEAD CT SCAN: No CT evidence of acute intracranial hemorrhage, mass effect or acute territorial infarct.    A/P  # Neuro- r/o cva- MRI of brain ord. Neurology appreciated  # Onc- ? malignancy- Ct of c/a/p . Ir eval for Bxp of node  # Elevated d dimmer- agree Lovenox bid in this patient with incidental finding of Covid19 positive w/o hypoxia.- will need to hold for Ir Bxp

## 2022-01-17 NOTE — PROGRESS NOTE ADULT - PROBLEM SELECTOR PLAN 4
- Check lipid panel  - Continue atorvastatin 40mg - Lipid panel LDL 73  - Continue atorvastatin 40mg

## 2022-01-17 NOTE — PROGRESS NOTE ADULT - SUBJECTIVE AND OBJECTIVE BOX
PROGRESS NOTE:     Patient is a 61y old  Male who presents with a chief complaint of Lip swelling (2022 15:09)      SUBJECTIVE / OVERNIGHT EVENTS:    ADDITIONAL REVIEW OF SYSTEMS: 10 point ROS negative except per HPI    MEDICATIONS  (STANDING):  amLODIPine   Tablet 10 milliGRAM(s) Oral daily  aspirin  chewable 81 milliGRAM(s) Oral daily  atorvastatin 40 milliGRAM(s) Oral at bedtime  dextrose 40% Gel 15 Gram(s) Oral once  dextrose 5%. 1000 milliLiter(s) (50 mL/Hr) IV Continuous <Continuous>  dextrose 5%. 1000 milliLiter(s) (100 mL/Hr) IV Continuous <Continuous>  dextrose 50% Injectable 25 Gram(s) IV Push once  dextrose 50% Injectable 12.5 Gram(s) IV Push once  dextrose 50% Injectable 25 Gram(s) IV Push once  enoxaparin Injectable 80 milliGRAM(s) SubCutaneous two times a day  famotidine    Tablet 20 milliGRAM(s) Oral daily  glucagon  Injectable 1 milliGRAM(s) IntraMuscular once  hydrochlorothiazide 25 milliGRAM(s) Oral daily  insulin lispro (ADMELOG) corrective regimen sliding scale   SubCutaneous three times a day before meals  insulin lispro (ADMELOG) corrective regimen sliding scale   SubCutaneous at bedtime  multivitamin 1 Tablet(s) Oral daily  sodium chloride 0.9%. 1000 milliLiter(s) (125 mL/Hr) IV Continuous <Continuous>    MEDICATIONS  (PRN):  acetaminophen     Tablet .. 650 milliGRAM(s) Oral every 6 hours PRN Temp greater or equal to 38C (100.4F), Mild Pain (1 - 3)  melatonin 3 milliGRAM(s) Oral at bedtime PRN Insomnia      CAPILLARY BLOOD GLUCOSE      POCT Blood Glucose.: 225 mg/dL (2022 22:24)  POCT Blood Glucose.: 215 mg/dL (2022 17:23)  POCT Blood Glucose.: 185 mg/dL (2022 12:51)    I&O's Summary      PHYSICAL EXAM:  Vital Signs Last 24 Hrs  T(C): 36.8 (2022 05:00), Max: 36.8 (2022 21:13)  T(F): 98.3 (2022 05:00), Max: 98.3 (2022 21:13)  HR: 73 (2022 05:00) (73 - 86)  BP: 132/83 (2022 05:00) (128/72 - 148/92)  BP(mean): --  RR: 18 (2022 05:00) (17 - 18)  SpO2: 96% (2022 05:00) (96% - 100%)    CONSTITUTIONAL: NAD, well-developed, A&Ox3 to person, place, time.  RESPIRATORY: Normal respiratory effort; lungs are clear to auscultation bilaterally  CARDIOVASCULAR: Regular rate and rhythm, normal S1 and S2, no murmur/rub/gallop; No lower extremity edema; Peripheral pulses are 2+ bilaterally  ABDOMEN: Nontender to palpation, no rebound/guarding; No hepatosplenomegaly  MUSCLOSKELETAL: no clubbing or cyanosis of digits; no joint swelling or tenderness to palpation  NEURO: CN 2-12 grossly intact, moves all limbs spontaneously      LABS:                          14.9   13.08 )-----------( 251      ( 2022 02:17 )             43.4     -16    135  |  97<L>  |  6<L>  ----------------------------<  228<H>  3.8   |  29  |  0.70    Ca    8.2<L>      2022 08:13    TPro  7.2  /  Alb  4.1  /  TBili  0.5  /  DBili  x   /  AST  19  /  ALT  28  /  AlkPhos  85  -16    PT/INR - ( 2022 02:17 )   PT: 11.7 sec;   INR: 1.03 ratio         PTT - ( 2022 02:17 )  PTT:26.6 sec      -----    MICRO  Urinalysis Basic - ( 2022 05:15 )    Color: Colorless / Appearance: Clear / S.005 / pH: x  Gluc: x / Ketone: Negative  / Bili: Negative / Urobili: <2 mg/dL   Blood: x / Protein: Negative / Nitrite: Negative   Leuk Esterase: Negative / RBC: x / WBC x   Sq Epi: x / Non Sq Epi: x / Bacteria: x        -----    TRENDS  Hemoglobin: 14.9 g/dL ( @ 02:17)    Creatinine Trend: 0.70<--, 0.65<--  ----  RADIOLOGY & ADDITIONAL TESTS:  Results Reviewed:   Imaging Personally Reviewed:  Electrocardiogram Personally Reviewed:    COORDINATION OF CARE:  Care Discussed with Consultants/Other Providers [Y/N]:  Prior or Outpatient Records Reviewed [Y/N]:   PROGRESS NOTE:     Patient is a 61y old  Male who presents with a chief complaint of Lip swelling (2022 15:09)      SUBJECTIVE / OVERNIGHT EVENTS: No events overnight. Denied fever, chills, cough, SOB, nausea, vomiting, abdominal pain. Patient reported resolved lip swelling and numbness. Otherwise, labs remarkable for K 2.9 -> repleted. Pending MRI and CT C/A/P for workup of lymphadenopathy.     ADDITIONAL REVIEW OF SYSTEMS: 10 point ROS negative except per HPI    MEDICATIONS  (STANDING):  amLODIPine   Tablet 10 milliGRAM(s) Oral daily  aspirin  chewable 81 milliGRAM(s) Oral daily  atorvastatin 40 milliGRAM(s) Oral at bedtime  dextrose 40% Gel 15 Gram(s) Oral once  dextrose 5%. 1000 milliLiter(s) (50 mL/Hr) IV Continuous <Continuous>  dextrose 5%. 1000 milliLiter(s) (100 mL/Hr) IV Continuous <Continuous>  dextrose 50% Injectable 25 Gram(s) IV Push once  dextrose 50% Injectable 12.5 Gram(s) IV Push once  dextrose 50% Injectable 25 Gram(s) IV Push once  enoxaparin Injectable 80 milliGRAM(s) SubCutaneous two times a day  famotidine    Tablet 20 milliGRAM(s) Oral daily  glucagon  Injectable 1 milliGRAM(s) IntraMuscular once  hydrochlorothiazide 25 milliGRAM(s) Oral daily  insulin lispro (ADMELOG) corrective regimen sliding scale   SubCutaneous three times a day before meals  insulin lispro (ADMELOG) corrective regimen sliding scale   SubCutaneous at bedtime  multivitamin 1 Tablet(s) Oral daily  sodium chloride 0.9%. 1000 milliLiter(s) (125 mL/Hr) IV Continuous <Continuous>    MEDICATIONS  (PRN):  acetaminophen     Tablet .. 650 milliGRAM(s) Oral every 6 hours PRN Temp greater or equal to 38C (100.4F), Mild Pain (1 - 3)  melatonin 3 milliGRAM(s) Oral at bedtime PRN Insomnia      CAPILLARY BLOOD GLUCOSE      POCT Blood Glucose.: 225 mg/dL (2022 22:24)  POCT Blood Glucose.: 215 mg/dL (2022 17:23)  POCT Blood Glucose.: 185 mg/dL (2022 12:51)    I&O's Summary      PHYSICAL EXAM:  Vital Signs Last 24 Hrs  T(C): 36.8 (2022 05:00), Max: 36.8 (2022 21:13)  T(F): 98.3 (2022 05:00), Max: 98.3 (2022 21:13)  HR: 73 (2022 05:00) (73 - 86)  BP: 132/83 (2022 05:00) (128/72 - 148/92)  RR: 18 (2022 05:00) (17 - 18)  SpO2: 96% (2022 05:00) (96% - 100%)    GENERAL: Alert.  No acute distress.   HEAD:  Atraumatic. Normocephalic.  EYES: EOMI. PERRLA. Normal conjunctiva/sclera.  ENT: Neck supple. No JVD. Moist oral mucosa.    LYMPH: Palpable supraclavicular/cervical lymph nodes.   CARDIAC: Regular rate and rhythm. S1. S2. No murmur. No rub.    LUNG/CHEST: CTAB. BS equal bilaterally. No wheezes. No rales.    ABDOMEN: Soft. No tenderness. No distension. . Normal bowel sounds.  EXTREMITIES:  No clubbing. No cyanosis. Moving all 4.  NEUROLOGY: A&Ox3. Non-focal exam. Cranial nerves intact. Normal speech. Sensation intact.  No appreciable facial swelling. Sensation to light touch in V1-V3 intact intact and same on both sides   PSYCH: Normal behavior. Normal affect.  SKIN: No jaundice. No erythema. No rash/lesion.      LABS:                          14.9   13.08 )-----------( 251      ( 2022 02:17 )             43.4       135  |  97<L>  |  6<L>  ----------------------------<  228<H>  3.8   |  29  |  0.70    Ca    8.2<L>      2022 08:13    TPro  7.2  /  Alb  4.1  /  TBili  0.5  /  DBili  x   /  AST  19  /  ALT  28  /  AlkPhos  85  01-16    PT/INR - ( 2022 02:17 )   PT: 11.7 sec;   INR: 1.03 ratio         PTT - ( 2022 02:17 )  PTT:26.6 sec      -----    MICRO  Urinalysis Basic - ( 2022 05:15 )    Color: Colorless / Appearance: Clear / S.005 / pH: x  Gluc: x / Ketone: Negative  / Bili: Negative / Urobili: <2 mg/dL   Blood: x / Protein: Negative / Nitrite: Negative   Leuk Esterase: Negative / RBC: x / WBC x   Sq Epi: x / Non Sq Epi: x / Bacteria: x        -----    TRENDS  Hemoglobin: 14.9 g/dL ( @ 02:17)    Creatinine Trend: 0.70<--, 0.65<--  ----  RADIOLOGY & ADDITIONAL TESTS:  Results Reviewed:   Imaging Personally Reviewed:  Electrocardiogram Personally Reviewed:    COORDINATION OF CARE:  Care Discussed with Consultants/Other Providers [Y/N]:  Prior or Outpatient Records Reviewed [Y/N]:

## 2022-01-17 NOTE — PROGRESS NOTE ADULT - PROBLEM SELECTOR PLAN 5
- On Metformin 500 at home  - Check HbA1C  - Low dose ISS and finger sticks qAC/qHS - On Metformin 500 at home  - Check HbA1C  - Low dose ISS and finger sticks qAC/qHS to maintain BG <180

## 2022-01-17 NOTE — PROGRESS NOTE ADULT - PROBLEM SELECTOR PLAN 1
Acute onset R facial edema and swelling. Low suspicion for CVA   - CT HEAD: acute intracranial hemorrhage, mass effect or acute territorial infarct  - CT ANGIOGRAPHY BRAIN: No vessel occlusion, flow-limiting stenosis or aneurysm is identified about the [place].  - CT MAXILLOFACIAL: No facial swelling lower lip swelling is appreciated.. No inflammatory change or abscess is visualized.  - CT NECK: no vessel stenosis or dissection. Noted generalized lymphadenopathy including left supraclavicular lymph nodes measuring up to 11 x 8 mm, right supraclavicular lymph nodes measuring 8 x 6 mm, left retropectoral lymph nodes up to 5 x 4mm.   Plan:   - MRI rain w/ and w/p contrast  - Check B12, folate, TSH, Mg, lyme, Utox  - TTE and continue telemetry monitoring  - Continue ASA 81mg and atorvastatin 40mg   - Obtain lipid panel, HbA1C in the AM Acute onset R facial edema and swelling. Low suspicion for CVA   - CT HEAD: acute intracranial hemorrhage, mass effect or acute territorial infarct  - CT ANGIOGRAPHY BRAIN: No vessel occlusion, flow-limiting stenosis or aneurysm is identified about the [place].  - CT MAXILLOFACIAL: No facial swelling lower lip swelling is appreciated.. No inflammatory change or abscess is visualized.  - CT NECK: no vessel stenosis or dissection. Noted generalized lymphadenopathy including left supraclavicular lymph nodes measuring up to 11 x 8 mm, right supraclavicular lymph nodes measuring 8 x 6 mm, left retropectoral lymph nodes up to 5 x 4mm.   - Lipid panel, HbA1C 7.4   Plan:   - MRI brain w/ and w/p contrast  - B12 177 - will start IM B12 1,000 mcg  injections daily for 1 week then every other day for 3 weeks   - Follow up folate, TSH, lyme, Utox  - TTE and continue telemetry monitoring  - Continue ASA 81mg and atorvastatin 40mg   - Obtain lipid panel, HbA1C in the AM

## 2022-01-18 LAB
ALBUMIN SERPL ELPH-MCNC: 3.7 G/DL — SIGNIFICANT CHANGE UP (ref 3.3–5)
ALP SERPL-CCNC: 74 U/L — SIGNIFICANT CHANGE UP (ref 40–120)
ALT FLD-CCNC: 30 U/L — SIGNIFICANT CHANGE UP (ref 4–41)
ANION GAP SERPL CALC-SCNC: 10 MMOL/L — SIGNIFICANT CHANGE UP (ref 7–14)
AST SERPL-CCNC: 22 U/L — SIGNIFICANT CHANGE UP (ref 4–40)
BASOPHILS # BLD AUTO: 0.05 K/UL — SIGNIFICANT CHANGE UP (ref 0–0.2)
BASOPHILS NFR BLD AUTO: 0.5 % — SIGNIFICANT CHANGE UP (ref 0–2)
BILIRUB SERPL-MCNC: 0.5 MG/DL — SIGNIFICANT CHANGE UP (ref 0.2–1.2)
BUN SERPL-MCNC: 8 MG/DL — SIGNIFICANT CHANGE UP (ref 7–23)
CALCIUM SERPL-MCNC: 8.3 MG/DL — LOW (ref 8.4–10.5)
CHLORIDE SERPL-SCNC: 98 MMOL/L — SIGNIFICANT CHANGE UP (ref 98–107)
CO2 SERPL-SCNC: 27 MMOL/L — SIGNIFICANT CHANGE UP (ref 22–31)
CREAT SERPL-MCNC: 0.68 MG/DL — SIGNIFICANT CHANGE UP (ref 0.5–1.3)
EOSINOPHIL # BLD AUTO: 0.09 K/UL — SIGNIFICANT CHANGE UP (ref 0–0.5)
EOSINOPHIL NFR BLD AUTO: 1 % — SIGNIFICANT CHANGE UP (ref 0–6)
GLUCOSE BLDC GLUCOMTR-MCNC: 139 MG/DL — HIGH (ref 70–99)
GLUCOSE BLDC GLUCOMTR-MCNC: 229 MG/DL — HIGH (ref 70–99)
GLUCOSE SERPL-MCNC: 111 MG/DL — HIGH (ref 70–99)
HCT VFR BLD CALC: 43 % — SIGNIFICANT CHANGE UP (ref 39–50)
HGB BLD-MCNC: 14.4 G/DL — SIGNIFICANT CHANGE UP (ref 13–17)
IANC: 4.85 K/UL — SIGNIFICANT CHANGE UP (ref 1.5–8.5)
IMM GRANULOCYTES NFR BLD AUTO: 0.3 % — SIGNIFICANT CHANGE UP (ref 0–1.5)
LYMPHOCYTES # BLD AUTO: 3.74 K/UL — HIGH (ref 1–3.3)
LYMPHOCYTES # BLD AUTO: 39.5 % — SIGNIFICANT CHANGE UP (ref 13–44)
MAGNESIUM SERPL-MCNC: 2 MG/DL — SIGNIFICANT CHANGE UP (ref 1.6–2.6)
MCHC RBC-ENTMCNC: 27.4 PG — SIGNIFICANT CHANGE UP (ref 27–34)
MCHC RBC-ENTMCNC: 33.5 GM/DL — SIGNIFICANT CHANGE UP (ref 32–36)
MCV RBC AUTO: 81.9 FL — SIGNIFICANT CHANGE UP (ref 80–100)
MONOCYTES # BLD AUTO: 0.71 K/UL — SIGNIFICANT CHANGE UP (ref 0–0.9)
MONOCYTES NFR BLD AUTO: 7.5 % — SIGNIFICANT CHANGE UP (ref 2–14)
MRSA PCR RESULT.: SIGNIFICANT CHANGE UP
NEUTROPHILS # BLD AUTO: 4.85 K/UL — SIGNIFICANT CHANGE UP (ref 1.8–7.4)
NEUTROPHILS NFR BLD AUTO: 51.2 % — SIGNIFICANT CHANGE UP (ref 43–77)
NRBC # BLD: 0 /100 WBCS — SIGNIFICANT CHANGE UP
NRBC # FLD: 0 K/UL — SIGNIFICANT CHANGE UP
PHOSPHATE SERPL-MCNC: 3.6 MG/DL — SIGNIFICANT CHANGE UP (ref 2.5–4.5)
PLATELET # BLD AUTO: 269 K/UL — SIGNIFICANT CHANGE UP (ref 150–400)
POTASSIUM SERPL-MCNC: 3.3 MMOL/L — LOW (ref 3.5–5.3)
POTASSIUM SERPL-SCNC: 3.3 MMOL/L — LOW (ref 3.5–5.3)
PROT SERPL-MCNC: 6.9 G/DL — SIGNIFICANT CHANGE UP (ref 6–8.3)
RBC # BLD: 5.25 M/UL — SIGNIFICANT CHANGE UP (ref 4.2–5.8)
RBC # FLD: 12.9 % — SIGNIFICANT CHANGE UP (ref 10.3–14.5)
S AUREUS DNA NOSE QL NAA+PROBE: SIGNIFICANT CHANGE UP
SODIUM SERPL-SCNC: 135 MMOL/L — SIGNIFICANT CHANGE UP (ref 135–145)
WBC # BLD: 9.47 K/UL — SIGNIFICANT CHANGE UP (ref 3.8–10.5)
WBC # FLD AUTO: 9.47 K/UL — SIGNIFICANT CHANGE UP (ref 3.8–10.5)

## 2022-01-18 PROCEDURE — 99233 SBSQ HOSP IP/OBS HIGH 50: CPT | Mod: GC

## 2022-01-18 PROCEDURE — 93306 TTE W/DOPPLER COMPLETE: CPT | Mod: 26

## 2022-01-18 PROCEDURE — 70553 MRI BRAIN STEM W/O & W/DYE: CPT | Mod: 26

## 2022-01-18 RX ORDER — POTASSIUM CHLORIDE 20 MEQ
40 PACKET (EA) ORAL ONCE
Refills: 0 | Status: COMPLETED | OUTPATIENT
Start: 2022-01-18 | End: 2022-01-18

## 2022-01-18 RX ADMIN — AMLODIPINE BESYLATE 10 MILLIGRAM(S): 2.5 TABLET ORAL at 05:34

## 2022-01-18 RX ADMIN — FAMOTIDINE 20 MILLIGRAM(S): 10 INJECTION INTRAVENOUS at 10:04

## 2022-01-18 RX ADMIN — Medication 25 MILLIGRAM(S): at 05:34

## 2022-01-18 RX ADMIN — Medication 40 MILLIEQUIVALENT(S): at 11:35

## 2022-01-18 RX ADMIN — PREGABALIN 1000 MICROGRAM(S): 225 CAPSULE ORAL at 10:03

## 2022-01-18 RX ADMIN — ENOXAPARIN SODIUM 80 MILLIGRAM(S): 100 INJECTION SUBCUTANEOUS at 18:31

## 2022-01-18 RX ADMIN — Medication 81 MILLIGRAM(S): at 10:04

## 2022-01-18 RX ADMIN — ATORVASTATIN CALCIUM 40 MILLIGRAM(S): 80 TABLET, FILM COATED ORAL at 21:13

## 2022-01-18 RX ADMIN — ENOXAPARIN SODIUM 80 MILLIGRAM(S): 100 INJECTION SUBCUTANEOUS at 05:34

## 2022-01-18 RX ADMIN — CHLORHEXIDINE GLUCONATE 1 APPLICATION(S): 213 SOLUTION TOPICAL at 10:10

## 2022-01-18 RX ADMIN — Medication 1 TABLET(S): at 10:03

## 2022-01-18 NOTE — PROGRESS NOTE ADULT - PROBLEM SELECTOR PLAN 5
- On Metformin 500 at home  - Check HbA1C  - Low dose ISS and finger sticks qAC/qHS to maintain BG <180

## 2022-01-18 NOTE — PROGRESS NOTE ADULT - SUBJECTIVE AND OBJECTIVE BOX
PROGRESS NOTE:     Patient is a 61y old  Male who presents with a chief complaint of Lip swelling (17 Jan 2022 06:53)      SUBJECTIVE / OVERNIGHT EVENTS:    ADDITIONAL REVIEW OF SYSTEMS: 10 point ROS negative except per HPI    MEDICATIONS  (STANDING):  amLODIPine   Tablet 10 milliGRAM(s) Oral daily  aspirin  chewable 81 milliGRAM(s) Oral daily  atorvastatin 40 milliGRAM(s) Oral at bedtime  chlorhexidine 2% Cloths 1 Application(s) Topical daily  cyanocobalamin Injectable 1000 MICROGram(s) IntraMuscular daily  dextrose 40% Gel 15 Gram(s) Oral once  dextrose 5%. 1000 milliLiter(s) (50 mL/Hr) IV Continuous <Continuous>  dextrose 5%. 1000 milliLiter(s) (100 mL/Hr) IV Continuous <Continuous>  dextrose 50% Injectable 25 Gram(s) IV Push once  dextrose 50% Injectable 12.5 Gram(s) IV Push once  dextrose 50% Injectable 25 Gram(s) IV Push once  enoxaparin Injectable 80 milliGRAM(s) SubCutaneous two times a day  famotidine    Tablet 20 milliGRAM(s) Oral daily  glucagon  Injectable 1 milliGRAM(s) IntraMuscular once  hydrochlorothiazide 25 milliGRAM(s) Oral daily  insulin lispro (ADMELOG) corrective regimen sliding scale   SubCutaneous three times a day before meals  insulin lispro (ADMELOG) corrective regimen sliding scale   SubCutaneous at bedtime  multivitamin 1 Tablet(s) Oral daily  sodium chloride 0.9%. 1000 milliLiter(s) (125 mL/Hr) IV Continuous <Continuous>    MEDICATIONS  (PRN):  acetaminophen     Tablet .. 650 milliGRAM(s) Oral every 6 hours PRN Temp greater or equal to 38C (100.4F), Mild Pain (1 - 3)  melatonin 3 milliGRAM(s) Oral at bedtime PRN Insomnia      CAPILLARY BLOOD GLUCOSE      POCT Blood Glucose.: 119 mg/dL (17 Jan 2022 22:29)  POCT Blood Glucose.: 142 mg/dL (17 Jan 2022 17:20)  POCT Blood Glucose.: 180 mg/dL (17 Jan 2022 12:21)  POCT Blood Glucose.: 133 mg/dL (17 Jan 2022 08:33)    I&O's Summary      PHYSICAL EXAM:  Vital Signs Last 24 Hrs  T(C): 36.9 (18 Jan 2022 05:30), Max: 37.2 (17 Jan 2022 12:00)  T(F): 98.4 (18 Jan 2022 05:30), Max: 98.9 (17 Jan 2022 12:00)  HR: 72 (18 Jan 2022 05:30) (65 - 79)  BP: 130/83 (18 Jan 2022 05:30) (130/79 - 139/86)  BP(mean): --  RR: 18 (18 Jan 2022 05:30) (18 - 18)  SpO2: 96% (18 Jan 2022 05:30) (95% - 98%)    CONSTITUTIONAL: NAD, well-developed, A&Ox3 to person, place, time.  RESPIRATORY: Normal respiratory effort; lungs are clear to auscultation bilaterally  CARDIOVASCULAR: Regular rate and rhythm, normal S1 and S2, no murmur/rub/gallop; No lower extremity edema; Peripheral pulses are 2+ bilaterally  ABDOMEN: Nontender to palpation, no rebound/guarding; No hepatosplenomegaly  MUSCLOSKELETAL: no clubbing or cyanosis of digits; no joint swelling or tenderness to palpation  NEURO: CN 2-12 grossly intact, moves all limbs spontaneously      LABS:                          13.8   8.24  )-----------( 266      ( 17 Jan 2022 09:09 )             41.0     01-17    136  |  97<L>  |  7   ----------------------------<  194<H>  3.2<L>   |  29  |  0.59    Ca    8.8      17 Jan 2022 11:49  Phos  3.0     01-17  Mg     2.10     01-17    TPro  6.7  /  Alb  3.9  /  TBili  0.4  /  DBili  x   /  AST  18  /  ALT  27  /  AlkPhos  79  01-17          -----    MICRO      Culture - Blood (collected 16 Jan 2022 07:15)  Source: .Blood Blood-Peripheral  Preliminary Report (17 Jan 2022 08:01):    No growth to date.    Culture - Blood (collected 16 Jan 2022 07:15)  Source: .Blood Blood-Peripheral  Preliminary Report (17 Jan 2022 08:01):    No growth to date.    Culture - Urine (collected 16 Jan 2022 05:09)  Source: Clean Catch Clean Catch (Midstream)  Final Report (17 Jan 2022 15:08):    <10,000 CFU/mL Normal Urogenital Candy      -----    TRENDS  Hemoglobin: 13.8 g/dL (01-17 @ 09:09)  Hemoglobin: 14.9 g/dL (01-16 @ 02:17)    Creatinine Trend: 0.59<--, 0.59<--, 0.70<--, 0.65<--  ----  RADIOLOGY & ADDITIONAL TESTS:  Results Reviewed:   Imaging Personally Reviewed:  Electrocardiogram Personally Reviewed:    COORDINATION OF CARE:  Care Discussed with Consultants/Other Providers [Y/N]:  Prior or Outpatient Records Reviewed [Y/N]:   PROGRESS NOTE:     Patient is a 61y old  Male who presents with a chief complaint of Lip swelling (17 Jan 2022 06:53)      SUBJECTIVE / OVERNIGHT EVENTS:  No events overnight. Denied fever, chills, cough, SOB, nausea, vomiting, abdominal pain. Patient reported resolved lip swelling and numbness.  CT C/A/P unremarkable with no evidence of malignancy. Pending MRI brain w/wo contrast     ADDITIONAL REVIEW OF SYSTEMS: 10 point ROS negative except per HPI    MEDICATIONS  (STANDING):  amLODIPine   Tablet 10 milliGRAM(s) Oral daily  aspirin  chewable 81 milliGRAM(s) Oral daily  atorvastatin 40 milliGRAM(s) Oral at bedtime  chlorhexidine 2% Cloths 1 Application(s) Topical daily  cyanocobalamin Injectable 1000 MICROGram(s) IntraMuscular daily  dextrose 40% Gel 15 Gram(s) Oral once  dextrose 5%. 1000 milliLiter(s) (50 mL/Hr) IV Continuous <Continuous>  dextrose 5%. 1000 milliLiter(s) (100 mL/Hr) IV Continuous <Continuous>  dextrose 50% Injectable 25 Gram(s) IV Push once  dextrose 50% Injectable 12.5 Gram(s) IV Push once  dextrose 50% Injectable 25 Gram(s) IV Push once  enoxaparin Injectable 80 milliGRAM(s) SubCutaneous two times a day  famotidine    Tablet 20 milliGRAM(s) Oral daily  glucagon  Injectable 1 milliGRAM(s) IntraMuscular once  hydrochlorothiazide 25 milliGRAM(s) Oral daily  insulin lispro (ADMELOG) corrective regimen sliding scale   SubCutaneous three times a day before meals  insulin lispro (ADMELOG) corrective regimen sliding scale   SubCutaneous at bedtime  multivitamin 1 Tablet(s) Oral daily  sodium chloride 0.9%. 1000 milliLiter(s) (125 mL/Hr) IV Continuous <Continuous>    MEDICATIONS  (PRN):  acetaminophen     Tablet .. 650 milliGRAM(s) Oral every 6 hours PRN Temp greater or equal to 38C (100.4F), Mild Pain (1 - 3)  melatonin 3 milliGRAM(s) Oral at bedtime PRN Insomnia      CAPILLARY BLOOD GLUCOSE      POCT Blood Glucose.: 119 mg/dL (17 Jan 2022 22:29)  POCT Blood Glucose.: 142 mg/dL (17 Jan 2022 17:20)  POCT Blood Glucose.: 180 mg/dL (17 Jan 2022 12:21)  POCT Blood Glucose.: 133 mg/dL (17 Jan 2022 08:33)    I&O's Summary      PHYSICAL EXAM:  Vital Signs Last 24 Hrs  T(C): 36.9 (18 Jan 2022 05:30), Max: 37.2 (17 Jan 2022 12:00)  T(F): 98.4 (18 Jan 2022 05:30), Max: 98.9 (17 Jan 2022 12:00)  HR: 72 (18 Jan 2022 05:30) (65 - 79)  BP: 130/83 (18 Jan 2022 05:30) (130/79 - 139/86)  RR: 18 (18 Jan 2022 05:30) (18 - 18)  SpO2: 96% (18 Jan 2022 05:30) (95% - 98%)    GENERAL: Alert.  No acute distress.   HEAD:  Atraumatic. Normocephalic.  EYES: EOMI. PERRLA. Normal conjunctiva/sclera.  ENT: Neck supple. No JVD. Moist oral mucosa.    LYMPH: Palpable supraclavicular/cervical lymph nodes.   CARDIAC: Regular rate and rhythm. S1. S2. No murmur. No rub.    LUNG/CHEST: CTAB. BS equal bilaterally. No wheezes. No rales.    ABDOMEN: Soft. No tenderness. No distension. . Normal bowel sounds.  EXTREMITIES:  No clubbing. No cyanosis. Moving all 4.  NEUROLOGY: A&Ox3. Non-focal exam. Cranial nerves intact. Normal speech. Sensation intact.  No appreciable facial swelling. Sensation to light touch in V1-V3 intact intact and same on both sides   PSYCH: Normal behavior. Normal affect.  SKIN: No jaundice. No erythema. No rash/lesion.      LABS:                          13.8   8.24  )-----------( 266      ( 17 Jan 2022 09:09 )             41.0          136  |  97<L>  |  7   ----------------------------<  194<H>  3.2<L>   |  29  |  0.59    Ca    8.8      17 Jan 2022 11:49  Phos  3.0     01-17  Mg     2.10     01-17    TPro  6.7  /  Alb  3.9  /  TBili  0.4  /  DBili  x   /  AST  18  /  ALT  27  /  AlkPhos  79  01-17          -----    MICRO      Culture - Blood (collected 16 Jan 2022 07:15)  Source: .Blood Blood-Peripheral  Preliminary Report (17 Jan 2022 08:01):    No growth to date.    Culture - Blood (collected 16 Jan 2022 07:15)  Source: .Blood Blood-Peripheral  Preliminary Report (17 Jan 2022 08:01):    No growth to date.    Culture - Urine (collected 16 Jan 2022 05:09)  Source: Clean Catch Clean Catch (Midstream)  Final Report (17 Jan 2022 15:08):    <10,000 CFU/mL Normal Urogenital Candy      -----    TRENDS  Hemoglobin: 13.8 g/dL (01-17 @ 09:09)  Hemoglobin: 14.9 g/dL (01-16 @ 02:17)    Creatinine Trend: 0.59<--, 0.59<--, 0.70<--, 0.65<--  ----  RADIOLOGY & ADDITIONAL TESTS:  Results Reviewed:   Imaging Personally Reviewed:  Electrocardiogram Personally Reviewed:    COORDINATION OF CARE:  Care Discussed with Consultants/Other Providers [Y/N]:  Prior or Outpatient Records Reviewed [Y/N]:   PROGRESS NOTE:     Patient is a 61y old  Male who presents with a chief complaint of Lip swelling (17 Jan 2022 06:53)      SUBJECTIVE / OVERNIGHT EVENTS:  No events overnight. Denied fever, chills, cough, SOB, nausea, vomiting, abdominal pain. Patient reported resolved lip swelling and numbness.  CT C/A/P unremarkable with no evidence of malignancy. Pending MRI brain w/wo contrast. Daughter updated on the plan on the phone together with the patient this AM.     ADDITIONAL REVIEW OF SYSTEMS: 10 point ROS negative except per HPI    MEDICATIONS  (STANDING):  amLODIPine   Tablet 10 milliGRAM(s) Oral daily  aspirin  chewable 81 milliGRAM(s) Oral daily  atorvastatin 40 milliGRAM(s) Oral at bedtime  chlorhexidine 2% Cloths 1 Application(s) Topical daily  cyanocobalamin Injectable 1000 MICROGram(s) IntraMuscular daily  dextrose 40% Gel 15 Gram(s) Oral once  dextrose 5%. 1000 milliLiter(s) (50 mL/Hr) IV Continuous <Continuous>  dextrose 5%. 1000 milliLiter(s) (100 mL/Hr) IV Continuous <Continuous>  dextrose 50% Injectable 25 Gram(s) IV Push once  dextrose 50% Injectable 12.5 Gram(s) IV Push once  dextrose 50% Injectable 25 Gram(s) IV Push once  enoxaparin Injectable 80 milliGRAM(s) SubCutaneous two times a day  famotidine    Tablet 20 milliGRAM(s) Oral daily  glucagon  Injectable 1 milliGRAM(s) IntraMuscular once  hydrochlorothiazide 25 milliGRAM(s) Oral daily  insulin lispro (ADMELOG) corrective regimen sliding scale   SubCutaneous three times a day before meals  insulin lispro (ADMELOG) corrective regimen sliding scale   SubCutaneous at bedtime  multivitamin 1 Tablet(s) Oral daily  sodium chloride 0.9%. 1000 milliLiter(s) (125 mL/Hr) IV Continuous <Continuous>    MEDICATIONS  (PRN):  acetaminophen     Tablet .. 650 milliGRAM(s) Oral every 6 hours PRN Temp greater or equal to 38C (100.4F), Mild Pain (1 - 3)  melatonin 3 milliGRAM(s) Oral at bedtime PRN Insomnia      CAPILLARY BLOOD GLUCOSE      POCT Blood Glucose.: 119 mg/dL (17 Jan 2022 22:29)  POCT Blood Glucose.: 142 mg/dL (17 Jan 2022 17:20)  POCT Blood Glucose.: 180 mg/dL (17 Jan 2022 12:21)  POCT Blood Glucose.: 133 mg/dL (17 Jan 2022 08:33)    I&O's Summary      PHYSICAL EXAM:  Vital Signs Last 24 Hrs  T(C): 36.9 (18 Jan 2022 05:30), Max: 37.2 (17 Jan 2022 12:00)  T(F): 98.4 (18 Jan 2022 05:30), Max: 98.9 (17 Jan 2022 12:00)  HR: 72 (18 Jan 2022 05:30) (65 - 79)  BP: 130/83 (18 Jan 2022 05:30) (130/79 - 139/86)  RR: 18 (18 Jan 2022 05:30) (18 - 18)  SpO2: 96% (18 Jan 2022 05:30) (95% - 98%)    GENERAL: Alert.  No acute distress.   HEAD:  Atraumatic. Normocephalic.  EYES: EOMI. PERRLA. Normal conjunctiva/sclera.  ENT: Neck supple. No JVD. Moist oral mucosa.    LYMPH: Palpable supraclavicular/cervical lymph nodes.   CARDIAC: Regular rate and rhythm. S1. S2. No murmur. No rub.    LUNG/CHEST: CTAB. BS equal bilaterally. No wheezes. No rales.    ABDOMEN: Soft. No tenderness. No distension. . Normal bowel sounds.  EXTREMITIES:  No clubbing. No cyanosis. Moving all 4.  NEUROLOGY: A&Ox3. Non-focal exam. Cranial nerves intact. Normal speech. Sensation intact.  No appreciable facial swelling. Sensation to light touch in V1-V3 intact intact and same on both sides   PSYCH: Normal behavior. Normal affect.  SKIN: No jaundice. No erythema. No rash/lesion.      LABS:                          13.8   8.24  )-----------( 266      ( 17 Jan 2022 09:09 )             41.0          136  |  97<L>  |  7   ----------------------------<  194<H>  3.2<L>   |  29  |  0.59    Ca    8.8      17 Jan 2022 11:49  Phos  3.0     01-17  Mg     2.10     01-17    TPro  6.7  /  Alb  3.9  /  TBili  0.4  /  DBili  x   /  AST  18  /  ALT  27  /  AlkPhos  79  01-17          -----    MICRO      Culture - Blood (collected 16 Jan 2022 07:15)  Source: .Blood Blood-Peripheral  Preliminary Report (17 Jan 2022 08:01):    No growth to date.    Culture - Blood (collected 16 Jan 2022 07:15)  Source: .Blood Blood-Peripheral  Preliminary Report (17 Jan 2022 08:01):    No growth to date.    Culture - Urine (collected 16 Jan 2022 05:09)  Source: Clean Catch Clean Catch (Midstream)  Final Report (17 Jan 2022 15:08):    <10,000 CFU/mL Normal Urogenital Candy      -----    TRENDS  Hemoglobin: 13.8 g/dL (01-17 @ 09:09)  Hemoglobin: 14.9 g/dL (01-16 @ 02:17)    Creatinine Trend: 0.59<--, 0.59<--, 0.70<--, 0.65<--  ----  RADIOLOGY & ADDITIONAL TESTS:  Results Reviewed:   Imaging Personally Reviewed:  Electrocardiogram Personally Reviewed:    COORDINATION OF CARE:  Care Discussed with Consultants/Other Providers [Y/N]:  Prior or Outpatient Records Reviewed [Y/N]:

## 2022-01-18 NOTE — PROGRESS NOTE ADULT - PROBLEM SELECTOR PLAN 3
Incidental COVID-19 infection. Patient is COVID-19 vaccinated x2. Currently, minimal symptoms and on room air.   - Supportive care  - Inflammatory markers notable for >2x ULN for d-dimers. Therefore, will start therapeutic lovenox 80mg BID   - Monitor patient on telemetry   - Maintain SpO2 > 90%

## 2022-01-18 NOTE — PROGRESS NOTE ADULT - PROBLEM SELECTOR PLAN 2
Generalized lymphadenopathy noted including left supraclavicular lymph nodes measuring up to 11 x 8 mm, right supraclavicular lymph nodes measuring 8 x 6 mm, left retropectoral lymph nodes up to 5 x 4mm.  In the setting of COVID-19 infection, favor infectious etiology. Also possible is autoimmune causes. Less likely malignancy/lymphproliferative disorders (denied B symptoms including weight loss, fever, drenching night sweats)   - Check OLVIN   - C1 esterase inhibitor  - Consult IR for biopsy? Generalized lymphadenopathy noted including left supraclavicular lymph nodes measuring up to 11 x 8 mm, right supraclavicular lymph nodes measuring 8 x 6 mm, left retropectoral lymph nodes up to 5 x 4mm.  In the setting of COVID-19 infection, favor infectious etiology. Also possible is autoimmune causes. Less likely malignancy/lymphproliferative disorders (denied B symptoms including weight loss, fever, drenching night sweats)   - CT C/A/P unremarkable   - C1 esterase inhibitor

## 2022-01-18 NOTE — PROGRESS NOTE ADULT - ATTENDING COMMENTS
Patient seen and examined at bedside. In brief, 61  [place] speaking w/ hx bell's palsy (~8 years ago), HTN, HLD, GERD, veganism presented on 1/15 with  acute onset R facial edema and numbness likely in the setting of allergic reaction, TIA, vs infectious etiology. Pt found to have Ct of head- no CVA/ hemorrhage. Neurology following.         A/P  # Neuro- r/o cva  - MRI of brain ord. will follow up with neurology re: if MRI can be done outpatient given COVID positive status    Neurology appreciated  # CT neck: generalized lymphadenopathy including left supraclavicular lymph nodes measuring up to 11 x 8 mm, right supraclavicular lymph nodes measuring 8 x 6 mm, left retropectoral lymph nodes up to 5 x 4mm.  :   ? malignancy vs. reactive i/s/o COVID infection - Ct of c/a/p without evidence of lymphadenopathy  - F/U HIV ordered for AM   - Defer . Ir eval for Bxp of node given size. patient to follow up outpatient   # Elevated d dimmer- agree Lovenox bid in this patient with incidental finding of Covid19 positive w/o hypoxia. Will likely discharge on DOAC for 30 days extended VTE prophylaxis     Dispo; pending MRI

## 2022-01-19 VITALS
SYSTOLIC BLOOD PRESSURE: 143 MMHG | RESPIRATION RATE: 18 BRPM | HEART RATE: 78 BPM | TEMPERATURE: 98 F | DIASTOLIC BLOOD PRESSURE: 85 MMHG | OXYGEN SATURATION: 98 %

## 2022-01-19 LAB
ALBUMIN SERPL ELPH-MCNC: 4 G/DL — SIGNIFICANT CHANGE UP (ref 3.3–5)
ALP SERPL-CCNC: 85 U/L — SIGNIFICANT CHANGE UP (ref 40–120)
ALT FLD-CCNC: 45 U/L — HIGH (ref 4–41)
ANION GAP SERPL CALC-SCNC: 14 MMOL/L — SIGNIFICANT CHANGE UP (ref 7–14)
AST SERPL-CCNC: 38 U/L — SIGNIFICANT CHANGE UP (ref 4–40)
BASOPHILS # BLD AUTO: 0.04 K/UL — SIGNIFICANT CHANGE UP (ref 0–0.2)
BASOPHILS NFR BLD AUTO: 0.4 % — SIGNIFICANT CHANGE UP (ref 0–2)
BILIRUB SERPL-MCNC: 0.5 MG/DL — SIGNIFICANT CHANGE UP (ref 0.2–1.2)
BUN SERPL-MCNC: 7 MG/DL — SIGNIFICANT CHANGE UP (ref 7–23)
C1INH FUNCTIONAL/C1INH TOTAL MFR SERPL: 30 MG/DL — SIGNIFICANT CHANGE UP (ref 21–39)
CALCIUM SERPL-MCNC: 8.6 MG/DL — SIGNIFICANT CHANGE UP (ref 8.4–10.5)
CHLORIDE SERPL-SCNC: 96 MMOL/L — LOW (ref 98–107)
CO2 SERPL-SCNC: 26 MMOL/L — SIGNIFICANT CHANGE UP (ref 22–31)
CREAT SERPL-MCNC: 0.67 MG/DL — SIGNIFICANT CHANGE UP (ref 0.5–1.3)
D DIMER BLD IA.RAPID-MCNC: <150 NG/ML DDU — SIGNIFICANT CHANGE UP
EOSINOPHIL # BLD AUTO: 0.17 K/UL — SIGNIFICANT CHANGE UP (ref 0–0.5)
EOSINOPHIL NFR BLD AUTO: 1.9 % — SIGNIFICANT CHANGE UP (ref 0–6)
GLUCOSE BLDC GLUCOMTR-MCNC: 128 MG/DL — HIGH (ref 70–99)
GLUCOSE BLDC GLUCOMTR-MCNC: 171 MG/DL — HIGH (ref 70–99)
GLUCOSE SERPL-MCNC: 131 MG/DL — HIGH (ref 70–99)
HCT VFR BLD CALC: 45.5 % — SIGNIFICANT CHANGE UP (ref 39–50)
HGB BLD-MCNC: 15.3 G/DL — SIGNIFICANT CHANGE UP (ref 13–17)
HIV 1+2 AB+HIV1 P24 AG SERPL QL IA: SIGNIFICANT CHANGE UP
IANC: 4.59 K/UL — SIGNIFICANT CHANGE UP (ref 1.5–8.5)
IMM GRANULOCYTES NFR BLD AUTO: 0.2 % — SIGNIFICANT CHANGE UP (ref 0–1.5)
LYMPHOCYTES # BLD AUTO: 3.53 K/UL — HIGH (ref 1–3.3)
LYMPHOCYTES # BLD AUTO: 38.7 % — SIGNIFICANT CHANGE UP (ref 13–44)
MAGNESIUM SERPL-MCNC: 2 MG/DL — SIGNIFICANT CHANGE UP (ref 1.6–2.6)
MCHC RBC-ENTMCNC: 28.1 PG — SIGNIFICANT CHANGE UP (ref 27–34)
MCHC RBC-ENTMCNC: 33.6 GM/DL — SIGNIFICANT CHANGE UP (ref 32–36)
MCV RBC AUTO: 83.5 FL — SIGNIFICANT CHANGE UP (ref 80–100)
MONOCYTES # BLD AUTO: 0.78 K/UL — SIGNIFICANT CHANGE UP (ref 0–0.9)
MONOCYTES NFR BLD AUTO: 8.5 % — SIGNIFICANT CHANGE UP (ref 2–14)
NEUTROPHILS # BLD AUTO: 4.59 K/UL — SIGNIFICANT CHANGE UP (ref 1.8–7.4)
NEUTROPHILS NFR BLD AUTO: 50.3 % — SIGNIFICANT CHANGE UP (ref 43–77)
NRBC # BLD: 0 /100 WBCS — SIGNIFICANT CHANGE UP
NRBC # FLD: 0 K/UL — SIGNIFICANT CHANGE UP
PHOSPHATE SERPL-MCNC: 4 MG/DL — SIGNIFICANT CHANGE UP (ref 2.5–4.5)
PLATELET # BLD AUTO: 264 K/UL — SIGNIFICANT CHANGE UP (ref 150–400)
POTASSIUM SERPL-MCNC: 3 MMOL/L — LOW (ref 3.5–5.3)
POTASSIUM SERPL-SCNC: 3 MMOL/L — LOW (ref 3.5–5.3)
PROT SERPL-MCNC: 7.1 G/DL — SIGNIFICANT CHANGE UP (ref 6–8.3)
RBC # BLD: 5.45 M/UL — SIGNIFICANT CHANGE UP (ref 4.2–5.8)
RBC # FLD: 12.6 % — SIGNIFICANT CHANGE UP (ref 10.3–14.5)
SODIUM SERPL-SCNC: 136 MMOL/L — SIGNIFICANT CHANGE UP (ref 135–145)
WBC # BLD: 9.13 K/UL — SIGNIFICANT CHANGE UP (ref 3.8–10.5)
WBC # FLD AUTO: 9.13 K/UL — SIGNIFICANT CHANGE UP (ref 3.8–10.5)

## 2022-01-19 PROCEDURE — 99239 HOSP IP/OBS DSCHRG MGMT >30: CPT | Mod: GC

## 2022-01-19 RX ORDER — ASPIRIN/CALCIUM CARB/MAGNESIUM 324 MG
1 TABLET ORAL
Qty: 0 | Refills: 0 | DISCHARGE

## 2022-01-19 RX ORDER — POTASSIUM CHLORIDE 20 MEQ
10 PACKET (EA) ORAL
Refills: 0 | Status: COMPLETED | OUTPATIENT
Start: 2022-01-19 | End: 2022-01-19

## 2022-01-19 RX ORDER — POTASSIUM CHLORIDE 20 MEQ
40 PACKET (EA) ORAL ONCE
Refills: 0 | Status: COMPLETED | OUTPATIENT
Start: 2022-01-19 | End: 2022-01-19

## 2022-01-19 RX ORDER — PREGABALIN 225 MG/1
1 CAPSULE ORAL
Qty: 15 | Refills: 0
Start: 2022-01-19 | End: 2022-02-02

## 2022-01-19 RX ADMIN — FAMOTIDINE 20 MILLIGRAM(S): 10 INJECTION INTRAVENOUS at 09:49

## 2022-01-19 RX ADMIN — Medication 25 MILLIGRAM(S): at 05:03

## 2022-01-19 RX ADMIN — Medication 1: at 12:15

## 2022-01-19 RX ADMIN — AMLODIPINE BESYLATE 10 MILLIGRAM(S): 2.5 TABLET ORAL at 05:03

## 2022-01-19 RX ADMIN — CHLORHEXIDINE GLUCONATE 1 APPLICATION(S): 213 SOLUTION TOPICAL at 09:50

## 2022-01-19 RX ADMIN — Medication 1 TABLET(S): at 09:49

## 2022-01-19 RX ADMIN — Medication 100 MILLIEQUIVALENT(S): at 12:33

## 2022-01-19 RX ADMIN — Medication 100 MILLIEQUIVALENT(S): at 11:02

## 2022-01-19 RX ADMIN — PREGABALIN 1000 MICROGRAM(S): 225 CAPSULE ORAL at 09:50

## 2022-01-19 RX ADMIN — Medication 100 MILLIEQUIVALENT(S): at 09:48

## 2022-01-19 RX ADMIN — Medication 40 MILLIEQUIVALENT(S): at 09:47

## 2022-01-19 RX ADMIN — ENOXAPARIN SODIUM 80 MILLIGRAM(S): 100 INJECTION SUBCUTANEOUS at 05:03

## 2022-01-19 NOTE — PROGRESS NOTE ADULT - ASSESSMENT
M Sully speaking w/ hx bell's palsy (~8 years ago), HTN, HLD, GERD, veganism presented on 1/15 with  acute onset R facial edema and numbness likely in the setting of allergic reaction, TIA, vs infectious etiology. 

## 2022-01-19 NOTE — DISCHARGE NOTE NURSING/CASE MANAGEMENT/SOCIAL WORK - NSDCPEFALRISK_GEN_ALL_CORE
For information on Fall & Injury Prevention, visit: [email or web address] OR  [email or web address] OR  https://www.cdc.gov/steadi/patient.html

## 2022-01-19 NOTE — CHART NOTE - NSCHARTNOTEFT_GEN_A_CORE
Per chart review, pt reports resolved lip swelling and numbness. Imaging reviewed.    < from: MR Head w/wo IV Cont (01.18.22 @ 16:09) >    There is no evidence acute hemorrhage mass effect or abnormal   enhancement seen.    Evaluation of the diffusion weighted sequence demonstrates no abnormal   areas of restricted diffusion to suggest acute infarct.    The large vessels demonstrate normal flow.    Minimal mucosal thickening is seen involving both ethmoid sinuses.    Both mastoid and middle ear regions appear clear.    IMPRESSION: There is no acute hemorrhage mass mass effect or abnormal   enhancement seen.    < end of copied text >    < from: CT Maxillofacial w/ IV Cont (01.16.22 @ 05:59) >    IMPRESSION:  NONCONTRAST HEAD CT SCAN: No CT evidence of acute intracranial   hemorrhage, mass effect or acute territorial infarct.    CT ANGIOGRAPHY NECK:  1. the cervical carotid systems and vertebral arteries are patent without   evidence of stenosis or dissection.  2.  There are numerous left supraclavicular lymph nodes measuring up to   11 x 8 mm.  There are a few right supraclavicular lymph nodes measuring 8   x 6 mm.  The axilla are incompletely imaged.  Multiple right-sided   retropectoral lymph nodes measure up to 10 x 6 mm.  A few left   retropectoral lymph nodes measure up to 5 x 4 mm. An underlying   infectious/inflammatory process, lymphoproliferative disorder, or   malignancy is not excluded.  3.  Mildly enlarged adenoids.  4.  Ossification of the posterior longitudinal ligament causes diffuse   mild to moderate spinal canal stenosis at C3-C7.    CT ANGIOGRAPHY BRAIN: Novessel occlusion, flow-limiting stenosis or   aneurysm is identified about the [place].    CT MAXILLOFACIAL: No facial swelling lower lip swelling is appreciated by   CT technique.  No inflammatory change or abscess is visualized.    POSTCONTRAST HEAD CT SCAN: No CT evidence of or acute territorial   infarct, mass or abnormal enhancement.  Dural venous sinuses and   cavernous sinuses are patent.    < end of copied text >      Impression: resolved subjective facial and lip numbness and swelling, unclear etiology, in pt w/ low B12 (can have neurologic symptoms when B12 <400)    Recommendations:  [x] CT max-face/CTA - negative, no VST  [x] MRI brain w/ and w/o contrast - negative, no evidence of infarct  [] stop aspirin 81mg daily and atorvastatin  [x] Ca wnl, B12 (177 low), folate (14.6), TSH (0.80), Mg (2.1), lyme (neg), Utox (not done)  [x] B12 supplementation per primary team  [x] HA1c (7.4), lipid panel (LDL 73)      Neurology signing off at this time, please page [phone] for further questions

## 2022-01-19 NOTE — PROGRESS NOTE ADULT - PROBLEM SELECTOR PLAN 2
Generalized lymphadenopathy noted including left supraclavicular lymph nodes measuring up to 11 x 8 mm, right supraclavicular lymph nodes measuring 8 x 6 mm, left retropectoral lymph nodes up to 5 x 4mm.  In the setting of COVID-19 infection, favor infectious etiology. Also possible is autoimmune causes. Less likely malignancy/lymphproliferative disorders (denied B symptoms including weight loss, fever, drenching night sweats)   - CT C/A/P unremarkable   - C1 esterase inhibitor Generalized lymphadenopathy noted including left supraclavicular lymph nodes measuring up to 11 x 8 mm, right supraclavicular lymph nodes measuring 8 x 6 mm, left retropectoral lymph nodes up to 5 x 4mm.  In the setting of COVID-19 infection, favor infectious etiology. Also possible is autoimmune causes. Less likely malignancy/lymphproliferative disorders (denied B symptoms including weight loss, fever, drenching night sweats)   - CT C/A/P unremarkable   - C1 esterase inhibitor pending

## 2022-01-19 NOTE — PROGRESS NOTE ADULT - ATTENDING COMMENTS
Patient seen and examined at bedside. In brief, 61  [place] speaking w/ hx bell's palsy (~8 years ago), HTN, HLD, GERD, veganism presented on 1/15 with  acute onset R facial edema and numbness likely in the setting of allergic reaction, TIA, vs infectious etiology. Pt found to have Ct of head- no CVA/ hemorrhage. Neurology following.         A/P  # Neuro- r/o cva  - MRI of brain without evidence of stroke    Neurology appreciated  # CT neck: generalized lymphadenopathy including left supraclavicular lymph nodes measuring up to 11 x 8 mm, right supraclavicular lymph nodes measuring 8 x 6 mm, left retropectoral lymph nodes up to 5 x 4mm.  :   ? malignancy vs. reactive i/s/o COVID infection - Ct of c/a/p without evidence of lymphadenopathy  - HIV ; negative   - Defer IR eval for Bxp of node given size and likely related to COVID. patient to follow up outpatient   # Elevated d dimmer- agree Lovenox bid in this patient with incidental finding of Covid19 positive w/o hypoxia.  D-dime <150, patient does not meet criteria for days extended VTE prophylaxis     Dispo;  today with PCP follow up 35 minutes spent discharge planning.

## 2022-01-19 NOTE — PROGRESS NOTE ADULT - SUBJECTIVE AND OBJECTIVE BOX
PROGRESS NOTE:     Patient is a 61y old  Male who presents with a chief complaint of Lip swelling (18 Jan 2022 07:00)      SUBJECTIVE / OVERNIGHT EVENTS:    ADDITIONAL REVIEW OF SYSTEMS: 10 point ROS negative except per HPI    MEDICATIONS  (STANDING):  amLODIPine   Tablet 10 milliGRAM(s) Oral daily  aspirin  chewable 81 milliGRAM(s) Oral daily  atorvastatin 40 milliGRAM(s) Oral at bedtime  chlorhexidine 2% Cloths 1 Application(s) Topical daily  cyanocobalamin Injectable 1000 MICROGram(s) IntraMuscular daily  dextrose 40% Gel 15 Gram(s) Oral once  dextrose 5%. 1000 milliLiter(s) (50 mL/Hr) IV Continuous <Continuous>  dextrose 5%. 1000 milliLiter(s) (100 mL/Hr) IV Continuous <Continuous>  dextrose 50% Injectable 25 Gram(s) IV Push once  dextrose 50% Injectable 12.5 Gram(s) IV Push once  dextrose 50% Injectable 25 Gram(s) IV Push once  enoxaparin Injectable 80 milliGRAM(s) SubCutaneous two times a day  famotidine    Tablet 20 milliGRAM(s) Oral daily  glucagon  Injectable 1 milliGRAM(s) IntraMuscular once  hydrochlorothiazide 25 milliGRAM(s) Oral daily  insulin lispro (ADMELOG) corrective regimen sliding scale   SubCutaneous three times a day before meals  insulin lispro (ADMELOG) corrective regimen sliding scale   SubCutaneous at bedtime  multivitamin 1 Tablet(s) Oral daily  sodium chloride 0.9%. 1000 milliLiter(s) (125 mL/Hr) IV Continuous <Continuous>    MEDICATIONS  (PRN):  acetaminophen     Tablet .. 650 milliGRAM(s) Oral every 6 hours PRN Temp greater or equal to 38C (100.4F), Mild Pain (1 - 3)  melatonin 3 milliGRAM(s) Oral at bedtime PRN Insomnia      CAPILLARY BLOOD GLUCOSE      POCT Blood Glucose.: 229 mg/dL (18 Jan 2022 21:23)  POCT Blood Glucose.: 139 mg/dL (18 Jan 2022 17:18)  POCT Blood Glucose.: 112 mg/dL (18 Jan 2022 12:12)  POCT Blood Glucose.: 128 mg/dL (18 Jan 2022 08:40)    I&O's Summary    18 Jan 2022 07:01  -  19 Jan 2022 07:00  --------------------------------------------------------  IN: 480 mL / OUT: 700 mL / NET: -220 mL        PHYSICAL EXAM:  Vital Signs Last 24 Hrs  T(C): 36.7 (19 Jan 2022 03:30), Max: 36.8 (18 Jan 2022 11:30)  T(F): 98 (19 Jan 2022 03:30), Max: 98.3 (18 Jan 2022 19:30)  HR: 78 (19 Jan 2022 03:30) (73 - 80)  BP: 143/85 (19 Jan 2022 03:30) (126/76 - 143/85)  BP(mean): --  RR: 18 (19 Jan 2022 03:30) (18 - 18)  SpO2: 98% (19 Jan 2022 03:30) (98% - 100%)    CONSTITUTIONAL: NAD, well-developed, A&Ox3 to person, place, time.  RESPIRATORY: Normal respiratory effort; lungs are clear to auscultation bilaterally  CARDIOVASCULAR: Regular rate and rhythm, normal S1 and S2, no murmur/rub/gallop; No lower extremity edema; Peripheral pulses are 2+ bilaterally  ABDOMEN: Nontender to palpation, no rebound/guarding; No hepatosplenomegaly  MUSCLOSKELETAL: no clubbing or cyanosis of digits; no joint swelling or tenderness to palpation  NEURO: CN 2-12 grossly intact, moves all limbs spontaneously      LABS:                          14.4   9.47  )-----------( 269      ( 18 Jan 2022 08:13 )             43.0     01-18    135  |  98  |  8   ----------------------------<  111<H>  3.3<L>   |  27  |  0.68    Ca    8.3<L>      18 Jan 2022 08:13  Phos  3.6     01-18  Mg     2.00     01-18    TPro  6.9  /  Alb  3.7  /  TBili  0.5  /  DBili  x   /  AST  22  /  ALT  30  /  AlkPhos  74  01-18          -----    MICRO      -----    TRENDS  Hemoglobin: 14.4 g/dL (01-18 @ 08:13)  Hemoglobin: 13.8 g/dL (01-17 @ 09:09)  Hemoglobin: 14.9 g/dL (01-16 @ 02:17)    Creatinine Trend: 0.68<--, 0.59<--, 0.59<--, 0.70<--, 0.65<--  ----  RADIOLOGY & ADDITIONAL TESTS:  Results Reviewed:   Imaging Personally Reviewed:  Electrocardiogram Personally Reviewed:    COORDINATION OF CARE:  Care Discussed with Consultants/Other Providers [Y/N]:  Prior or Outpatient Records Reviewed [Y/N]:   PROGRESS NOTE:     Patient is a 61y old  Male who presents with a chief complaint of Lip swelling (18 Jan 2022 07:00)      SUBJECTIVE / OVERNIGHT EVENTS: No events overnight.  Denied fever, chills, cough, SOB, nausea, vomiting, abdominal pain. Patient reported resolved lip swelling and numbness.  CT C/A/P unremarkable with no evidence of malignancy. MRI brain w/wo contrast confirmed no evidence of stroke. Daughter updated on the plan on the phone together with the patient this AM and for discharge today.       ADDITIONAL REVIEW OF SYSTEMS: 10 point ROS negative except per HPI    MEDICATIONS  (STANDING):  amLODIPine   Tablet 10 milliGRAM(s) Oral daily  aspirin  chewable 81 milliGRAM(s) Oral daily  atorvastatin 40 milliGRAM(s) Oral at bedtime  chlorhexidine 2% Cloths 1 Application(s) Topical daily  cyanocobalamin Injectable 1000 MICROGram(s) IntraMuscular daily  dextrose 40% Gel 15 Gram(s) Oral once  dextrose 5%. 1000 milliLiter(s) (50 mL/Hr) IV Continuous <Continuous>  dextrose 5%. 1000 milliLiter(s) (100 mL/Hr) IV Continuous <Continuous>  dextrose 50% Injectable 25 Gram(s) IV Push once  dextrose 50% Injectable 12.5 Gram(s) IV Push once  dextrose 50% Injectable 25 Gram(s) IV Push once  enoxaparin Injectable 80 milliGRAM(s) SubCutaneous two times a day  famotidine    Tablet 20 milliGRAM(s) Oral daily  glucagon  Injectable 1 milliGRAM(s) IntraMuscular once  hydrochlorothiazide 25 milliGRAM(s) Oral daily  insulin lispro (ADMELOG) corrective regimen sliding scale   SubCutaneous three times a day before meals  insulin lispro (ADMELOG) corrective regimen sliding scale   SubCutaneous at bedtime  multivitamin 1 Tablet(s) Oral daily  sodium chloride 0.9%. 1000 milliLiter(s) (125 mL/Hr) IV Continuous <Continuous>    MEDICATIONS  (PRN):  acetaminophen     Tablet .. 650 milliGRAM(s) Oral every 6 hours PRN Temp greater or equal to 38C (100.4F), Mild Pain (1 - 3)  melatonin 3 milliGRAM(s) Oral at bedtime PRN Insomnia      CAPILLARY BLOOD GLUCOSE      POCT Blood Glucose.: 229 mg/dL (18 Jan 2022 21:23)  POCT Blood Glucose.: 139 mg/dL (18 Jan 2022 17:18)  POCT Blood Glucose.: 112 mg/dL (18 Jan 2022 12:12)  POCT Blood Glucose.: 128 mg/dL (18 Jan 2022 08:40)    I&O's Summary    18 Jan 2022 07:01  -  19 Jan 2022 07:00  --------------------------------------------------------  IN: 480 mL / OUT: 700 mL / NET: -220 mL        PHYSICAL EXAM:  Vital Signs Last 24 Hrs  T(C): 36.7 (19 Jan 2022 03:30), Max: 36.8 (18 Jan 2022 11:30)  T(F): 98 (19 Jan 2022 03:30), Max: 98.3 (18 Jan 2022 19:30)  HR: 78 (19 Jan 2022 03:30) (73 - 80)  BP: 143/85 (19 Jan 2022 03:30) (126/76 - 143/85)  RR: 18 (19 Jan 2022 03:30) (18 - 18)  SpO2: 98% (19 Jan 2022 03:30) (98% - 100%)    GENERAL: Alert.  No acute distress.   HEAD:  Atraumatic. Normocephalic.  EYES: EOMI. PERRLA. Normal conjunctiva/sclera.  ENT: Neck supple. No JVD. Moist oral mucosa.    CARDIAC: Regular rate and rhythm. S1. S2. No murmur. No rub.    LUNG/CHEST: CTAB. BS equal bilaterally. No wheezes. No rales.    ABDOMEN: Soft. No tenderness. No distension. . Normal bowel sounds.  EXTREMITIES:  No clubbing. No cyanosis. Moving all 4.  NEUROLOGY: A&Ox3. Non-focal exam. Cranial nerves intact. Normal speech. Sensation intact.  No appreciable facial swelling. Sensation to light touch in V1-V3 intact intact and same on both sides   PSYCH: Normal behavior. Normal affect.  SKIN: No jaundice. No erythema. No rash/lesion.      LABS:                          14.4   9.47  )-----------( 269      ( 18 Jan 2022 08:13 )             43.0       135  |  98  |  8   ----------------------------<  111<H>  3.3<L>   |  27  |  0.68    Ca    8.3<L>      18 Jan 2022 08:13  Phos  3.6     01-18  Mg     2.00     01-18    TPro  6.9  /  Alb  3.7  /  TBili  0.5  /  DBili  x   /  AST  22  /  ALT  30  /  AlkPhos  74  01-18          -----    MICRO      -----    TRENDS  Hemoglobin: 14.4 g/dL (01-18 @ 08:13)  Hemoglobin: 13.8 g/dL (01-17 @ 09:09)  Hemoglobin: 14.9 g/dL (01-16 @ 02:17)    Creatinine Trend: 0.68<--, 0.59<--, 0.59<--, 0.70<--, 0.65<--  ----  RADIOLOGY & ADDITIONAL TESTS:  Results Reviewed:   Imaging Personally Reviewed:  Electrocardiogram Personally Reviewed:    COORDINATION OF CARE:  Care Discussed with Consultants/Other Providers [Y/N]:  Prior or Outpatient Records Reviewed [Y/N]:

## 2022-01-19 NOTE — PROGRESS NOTE ADULT - PROBLEM SELECTOR PLAN 4
- Lipid panel LDL 73  - Continue atorvastatin 40mg - Lipid panel LDL 73  - Continue atorvastatin 40mg (given diabetes)

## 2022-01-19 NOTE — PROGRESS NOTE ADULT - PROBLEM SELECTOR PLAN 3
Incidental COVID-19 infection. Patient is COVID-19 vaccinated x2. Currently, minimal symptoms and on room air.   - Supportive care  - Inflammatory markers notable for >2x ULN for d-dimers. Therefore, will start therapeutic lovenox 80mg BID   - Monitor patient on telemetry   - Maintain SpO2 > 90% Incidental COVID-19 infection. Patient is COVID-19 vaccinated x2. Currently, minimal symptoms and on room air.   - Supportive care  - Inflammatory markers notable for >2x ULN for d-dimers. Therefore, patient was started therapeutic lovenox 80mg BID   - D-dimers rechecked today with significantly downtrended. TTE unremarkable with no evidence of clots. Therefore, patient low risk for clots and no indication for AC at discharge   - Maintain SpO2 > 90%

## 2022-01-19 NOTE — PROGRESS NOTE ADULT - PROBLEM SELECTOR PLAN 1
Acute onset R facial edema and swelling. Low suspicion for CVA   - CT HEAD: acute intracranial hemorrhage, mass effect or acute territorial infarct  - CT ANGIOGRAPHY BRAIN: No vessel occlusion, flow-limiting stenosis or aneurysm is identified about the [place].  - CT MAXILLOFACIAL: No facial swelling lower lip swelling is appreciated.. No inflammatory change or abscess is visualized.  - CT NECK: no vessel stenosis or dissection. Noted generalized lymphadenopathy including left supraclavicular lymph nodes measuring up to 11 x 8 mm, right supraclavicular lymph nodes measuring 8 x 6 mm, left retropectoral lymph nodes up to 5 x 4mm.   - Lipid panel, HbA1C 7.4   Plan:   - MRI brain w/ and w/p contrast  - B12 177 - will start IM B12 1,000 mcg  injections daily for 1 week then every other day for 3 weeks   - folate, TSH, lyme, Utox unremarkable  - TTE and continue telemetry monitoring  - Continue ASA 81mg and atorvastatin 40mg Acute onset R facial edema and swelling. Low suspicion for CVA   - CT HEAD: acute intracranial hemorrhage, mass effect or acute territorial infarct  - CT ANGIOGRAPHY BRAIN: No vessel occlusion, flow-limiting stenosis or aneurysm is identified about the [place].  - CT MAXILLOFACIAL: No facial swelling lower lip swelling is appreciated.. No inflammatory change or abscess is visualized.  - CT NECK: no vessel stenosis or dissection. Noted generalized lymphadenopathy including left supraclavicular lymph nodes measuring up to 11 x 8 mm, right supraclavicular lymph nodes measuring 8 x 6 mm, left retropectoral lymph nodes up to 5 x 4mm.   - Lipid panel, HbA1C 7.4   - MRI brain w/ and w/p contrast - no evidence of stroke. Per neurology, discontinue ASA 81mg   - Given patient with T2DM, would continue atorvastatin 40mg   - B12 177 - will start IM B12 1,000 mcg  injections daily for 1 week then every other day for 3 weeks. Plan was discussed with patient and daughter to follow up with his PCP in 1 week. In the meantime, to take B12 1000mg tabs which was sent to their pharmacy   - folate, TSH, lyme unremarkable

## 2022-01-19 NOTE — DISCHARGE NOTE NURSING/CASE MANAGEMENT/SOCIAL WORK - PATIENT PORTAL LINK FT
You can access the FollowMyHealth Patient Portal offered by [facility] by registering at the following website: [email or web address]. By joining [facility]’s FollowMyHealth portal, you will also be able to view your health information using other applications (apps) compatible with our system.

## 2022-01-21 LAB
CULTURE RESULTS: SIGNIFICANT CHANGE UP
CULTURE RESULTS: SIGNIFICANT CHANGE UP
SPECIMEN SOURCE: SIGNIFICANT CHANGE UP
SPECIMEN SOURCE: SIGNIFICANT CHANGE UP

## 2023-12-19 NOTE — PROGRESS NOTE ADULT - PROBLEM SELECTOR PLAN 1
Acute onset R facial edema and swelling. Low suspicion for CVA   - CT HEAD: acute intracranial hemorrhage, mass effect or acute territorial infarct  - CT ANGIOGRAPHY BRAIN: No vessel occlusion, flow-limiting stenosis or aneurysm is identified about the [place].  - CT MAXILLOFACIAL: No facial swelling lower lip swelling is appreciated.. No inflammatory change or abscess is visualized.  - CT NECK: no vessel stenosis or dissection. Noted generalized lymphadenopathy including left supraclavicular lymph nodes measuring up to 11 x 8 mm, right supraclavicular lymph nodes measuring 8 x 6 mm, left retropectoral lymph nodes up to 5 x 4mm.   - Lipid panel, HbA1C 7.4   Plan:   - MRI brain w/ and w/p contrast  - B12 177 - will start IM B12 1,000 mcg  injections daily for 1 week then every other day for 3 weeks   - Follow up folate, TSH, lyme, Utox  - TTE and continue telemetry monitoring  - Continue ASA 81mg and atorvastatin 40mg   - Obtain lipid panel, HbA1C in the AM Acute onset R facial edema and swelling. Low suspicion for CVA   - CT HEAD: acute intracranial hemorrhage, mass effect or acute territorial infarct  - CT ANGIOGRAPHY BRAIN: No vessel occlusion, flow-limiting stenosis or aneurysm is identified about the [place].  - CT MAXILLOFACIAL: No facial swelling lower lip swelling is appreciated.. No inflammatory change or abscess is visualized.  - CT NECK: no vessel stenosis or dissection. Noted generalized lymphadenopathy including left supraclavicular lymph nodes measuring up to 11 x 8 mm, right supraclavicular lymph nodes measuring 8 x 6 mm, left retropectoral lymph nodes up to 5 x 4mm.   - Lipid panel, HbA1C 7.4   Plan:   - MRI brain w/ and w/p contrast  - B12 177 - will start IM B12 1,000 mcg  injections daily for 1 week then every other day for 3 weeks   - folate, TSH, lyme, Utox unremarkable  - TTE and continue telemetry monitoring  - Continue ASA 81mg and atorvastatin 40mg stated

## 2024-03-20 NOTE — ED ADULT NURSE NOTE - EXTENSIONS OF SELF_ADULT
